# Patient Record
Sex: MALE | Race: WHITE | NOT HISPANIC OR LATINO | Employment: FULL TIME | URBAN - METROPOLITAN AREA
[De-identification: names, ages, dates, MRNs, and addresses within clinical notes are randomized per-mention and may not be internally consistent; named-entity substitution may affect disease eponyms.]

---

## 2017-03-15 ENCOUNTER — OFFICE VISIT (OUTPATIENT)
Dept: INTERNAL MEDICINE | Age: 47
End: 2017-03-15

## 2017-03-15 VITALS
SYSTOLIC BLOOD PRESSURE: 154 MMHG | HEART RATE: 76 BPM | TEMPERATURE: 98.3 F | WEIGHT: 268 LBS | BODY MASS INDEX: 38.37 KG/M2 | HEIGHT: 70 IN | OXYGEN SATURATION: 99 % | DIASTOLIC BLOOD PRESSURE: 78 MMHG

## 2017-03-15 DIAGNOSIS — J11.1 FLU: Primary | ICD-10-CM

## 2017-03-15 DIAGNOSIS — R52 BODY ACHES: Primary | ICD-10-CM

## 2017-03-15 LAB
EXPIRATION DATE: ABNORMAL
FLUAV AG NPH QL: POSITIVE
FLUBV AG NPH QL: NEGATIVE
INTERNAL CONTROL: ABNORMAL
Lab: ABNORMAL

## 2017-03-15 PROCEDURE — 99214 OFFICE O/P EST MOD 30 MIN: CPT | Performed by: NURSE PRACTITIONER

## 2017-03-15 PROCEDURE — 87804 INFLUENZA ASSAY W/OPTIC: CPT | Performed by: NURSE PRACTITIONER

## 2017-03-15 RX ORDER — OSELTAMIVIR PHOSPHATE 75 MG/1
75 CAPSULE ORAL 2 TIMES DAILY
Qty: 10 CAPSULE | Refills: 0 | Status: SHIPPED | OUTPATIENT
Start: 2017-03-15 | End: 2019-02-19

## 2017-03-15 NOTE — PROGRESS NOTES
Moose Siddiqui / 46 y.o. / male  03/15/2017      CC:  Main reason(s) for today's visit: Cough (dry to some mucus x 3 days) and Generalized Body Aches (states hes feverish but no temp recorded)      HPI:   Patient presents to the office with C/O feelings of fever, chills, body aches, and cough. States that he has parents who have been ill and he may have been exposed to the flu. States that he has had sinus headaches and congestion as well.     The following portions of the patient's history were reviewed and updated as appropriate: past medical history and past surgical history.    ASSESSMENT & PLAN:    Problem List Items Addressed This Visit     None      Visit Diagnoses     Flu    -  Primary    Relevant Medications    oseltamivir (TAMIFLU) 75 MG capsule        No orders of the defined types were placed in this encounter.      · Summary/Discussion:     1. Patient presented to the office with CO fever, chills, body aches and cough. Upon physical inspection, patient lungs were CTA, but has some frontal sinus pressure, runny nose and productive cough. Flu test performed and was positive for the flu.  Patient was advised that we will prescribe  tamiflu to be taken as directed,and to increase his oral fluids. Also advised to monitor for worsening of symptoms and to contact the office if symptoms do not improve.      Follow-up: Return if symptoms worsen or fail to improve.     No future appointments.  ____________________________________________________________________    REVIEW OF SYSTEMS    Review of Systems   Constitutional: Positive for chills, fatigue and fever. Negative for activity change, appetite change and diaphoresis.   HENT: Positive for postnasal drip and sore throat. Negative for congestion, dental problem, ear discharge, facial swelling, hearing loss, mouth sores, trouble swallowing and voice change.    Eyes: Negative.    Respiratory: Positive for cough and wheezing. Negative for apnea, choking,  "chest tightness, shortness of breath and stridor.    Cardiovascular: Negative for chest pain, palpitations and leg swelling.   Gastrointestinal: Negative for abdominal distention, abdominal pain, anal bleeding, blood in stool, constipation, diarrhea, nausea, rectal pain and vomiting.   Endocrine: Negative for cold intolerance and heat intolerance.   Genitourinary: Negative for decreased urine volume, difficulty urinating, dysuria, enuresis, flank pain, frequency, genital sores, hematuria and urgency.   Musculoskeletal: Positive for myalgias. Negative for arthralgias, back pain, gait problem, joint swelling, neck pain and neck stiffness.   Skin: Negative for color change, pallor, rash and wound.   Allergic/Immunologic: Negative for environmental allergies, food allergies and immunocompromised state.   Neurological: Positive for headaches. Negative for dizziness, tremors, seizures, syncope, facial asymmetry, speech difficulty, weakness, light-headedness and numbness.   Hematological: Negative for adenopathy. Does not bruise/bleed easily.   Psychiatric/Behavioral: Negative for agitation, confusion, decreased concentration, self-injury, sleep disturbance and suicidal ideas. The patient is not nervous/anxious and is not hyperactive.    All other systems reviewed and are negative.    Other: As noted per HPI      VITALS    Visit Vitals   • /78   • Pulse 76   • Temp 98.3 °F (36.8 °C)   • Ht 70\" (177.8 cm)   • Wt 268 lb (122 kg)   • SpO2 99%   • BMI 38.45 kg/m2     BP Readings from Last 3 Encounters:   03/15/17 154/78   03/09/15 140/80   12/09/14 142/84     Wt Readings from Last 3 Encounters:   03/15/17 268 lb (122 kg)   03/09/15 252 lb 0.1 oz (114 kg)   12/09/14 258 lb 0.1 oz (117 kg)      Body mass index is 38.45 kg/(m^2).    PHYSICAL EXAMINATION    Physical Exam   Constitutional: He is oriented to person, place, and time. He appears well-developed and well-nourished.   HENT:   Head: Normocephalic.   Nose: Right " sinus exhibits frontal sinus tenderness. Left sinus exhibits frontal sinus tenderness.   Mouth/Throat: Uvula is midline and mucous membranes are normal. Posterior oropharyngeal erythema present.   Eyes: Conjunctivae are normal. Pupils are equal, round, and reactive to light.   Neck: Normal range of motion.   Cardiovascular: Normal rate and regular rhythm.    Pulmonary/Chest: Effort normal and breath sounds normal. No respiratory distress. He has no wheezes. He has no rales. He exhibits no tenderness.   Musculoskeletal: Normal range of motion.   Neurological: He is alert and oriented to person, place, and time.   Skin: Skin is warm and dry.   Psychiatric: He has a normal mood and affect. His behavior is normal.   Vitals reviewed.      REVIEWED DATA:    Labs:   Lab Results   Component Value Date     03/03/2015    K 4.4 03/03/2015    AST 15 03/03/2015    ALT 21 03/03/2015    BUN 17 03/03/2015    CREATININE 1.04 03/03/2015    EGFRIFNONA >60 03/03/2015    EGFRIFAFRI >60 03/03/2015       Lab Results   Component Value Date    GLU 95 03/03/2015    HGBA1C 5.4 03/03/2015       Lab Results   Component Value Date     (H) 03/03/2015    HDL 70 (H) 03/03/2015    TRIG 124 03/03/2015    CHOLHDLRATIO 3.5 03/03/2015       Lab Results   Component Value Date    TSH 1.17 03/03/2015    FREET4 1.20 03/03/2015          Lab Results   Component Value Date    WBC 0-2 03/09/2015        Imaging:        Medical Tests:        Summary of old records / correspondence / consultant report:        Request outside records:          ALLERGIES:    Review of patient's allergies indicates no known allergies.    MEDICATIONS:  Current Outpatient Prescriptions   Medication Sig Dispense Refill   • oseltamivir (TAMIFLU) 75 MG capsule Take 1 capsule by mouth 2 (Two) Times a Day. 10 capsule 0     No current facility-administered medications for this visit.        [facility]    The following portions of the patient's history were reviewed and updated as  appropriate: Allergies / Current Medications / Past Medical History / Surgical History / Social History / Family History    PROBLEM LIST:    There is no problem list on file for this patient.      PAST MEDICAL HX:    Past Medical History   Diagnosis Date   • Hypertension        PAST SURGICAL HX:    Past Surgical History   Procedure Laterality Date   • Appendectomy     • Colonoscopy         SOCIAL HX:    Social History     Social History   • Marital status: Unknown     Spouse name: N/A   • Number of children: N/A   • Years of education: N/A     Social History Main Topics   • Smoking status: Never Smoker   • Smokeless tobacco: Never Used   • Alcohol use None      Comment: occassional   • Drug use: None   • Sexual activity: Not Asked     Other Topics Concern   • None     Social History Narrative   • None       FAMILY HX:    Family History   Problem Relation Age of Onset   • Hypertension Mother    • Gallbladder disease Mother    • Cancer Mother    • Colon cancer Father    • Hypertension Father    • Gallbladder disease Father    • Cancer Father

## 2019-02-13 ENCOUNTER — TELEPHONE (OUTPATIENT)
Dept: INTERNAL MEDICINE | Age: 49
End: 2019-02-13

## 2019-02-19 ENCOUNTER — OFFICE VISIT (OUTPATIENT)
Dept: INTERNAL MEDICINE | Age: 49
End: 2019-02-19

## 2019-02-19 VITALS
OXYGEN SATURATION: 98 % | TEMPERATURE: 98.6 F | BODY MASS INDEX: 35.71 KG/M2 | HEART RATE: 80 BPM | HEIGHT: 70 IN | DIASTOLIC BLOOD PRESSURE: 88 MMHG | SYSTOLIC BLOOD PRESSURE: 140 MMHG | WEIGHT: 249.4 LBS

## 2019-02-19 DIAGNOSIS — I10 ESSENTIAL HYPERTENSION: Primary | ICD-10-CM

## 2019-02-19 DIAGNOSIS — E66.9 OBESITY (BMI 30-39.9): ICD-10-CM

## 2019-02-19 DIAGNOSIS — E78.5 HYPERLIPIDEMIA, UNSPECIFIED HYPERLIPIDEMIA TYPE: ICD-10-CM

## 2019-02-19 PROCEDURE — 99204 OFFICE O/P NEW MOD 45 MIN: CPT | Performed by: INTERNAL MEDICINE

## 2019-02-19 RX ORDER — HYDROCHLOROTHIAZIDE 12.5 MG/1
12.5 TABLET ORAL DAILY
Qty: 30 TABLET | Refills: 3 | Status: SHIPPED | OUTPATIENT
Start: 2019-02-19 | End: 2019-05-29 | Stop reason: SDUPTHER

## 2019-02-19 RX ORDER — IBUPROFEN 200 MG
200 TABLET ORAL EVERY 6 HOURS PRN
COMMUNITY
End: 2019-05-29

## 2019-02-19 NOTE — PROGRESS NOTES
"[facility] [place] [facility]  BITA ANN M.D.      Moose TAHIR Siddiqui / 48 y.o. / male  02/19/2019      ASSESSMENT & PLAN:    Problem List Items Addressed This Visit        High    Essential hypertension - Primary (Chronic)    Current Assessment & Plan     This is a new problem to this examiner.   Start HCTZ 12.5 mg qd. Maintain low sodium diet of less than 3 gm.  Weight loss and regular exercise advised.          Relevant Medications    hydrochlorothiazide (HYDRODIURIL) 12.5 MG tablet    Other Relevant Orders    Comprehensive Metabolic Panel    TSH+Free T4    Urinalysis With Microscopic If Indicated (No Culture) - Urine, Clean Catch       Medium    Hyperlipidemia (Chronic)    Current Assessment & Plan     Check labs. Consider statin therapy. Maintain low fat/cholesterol diet.  Weight loss advised.          Relevant Orders    Lipid Panel With / Chol / HDL Ratio       Low    Obesity (BMI 30-39.9) (Chronic)    Current Assessment & Plan     Maintain a low sugar/starch/carbohydrate diet and exercise regularly. Wt loss advised.           Relevant Orders    Hemoglobin A1c        Orders Placed This Encounter   Procedures   • Hemoglobin A1c   • Lipid Panel With / Chol / HDL Ratio   • Comprehensive Metabolic Panel   • TSH+Free T4   • Urinalysis With Microscopic If Indicated (No Culture) - Urine, Clean Catch     New Medications Ordered This Visit   Medications   • hydrochlorothiazide (HYDRODIURIL) 12.5 MG tablet     Sig: Take 1 tablet by mouth Daily.     Dispense:  30 tablet     Refill:  3       Summary/Discussion:      Return in about 3 months (around 5/19/2019) for Reassess today's problem(s).    ____________________________________________________________________    VITALS:    Visit Vitals  /88   Pulse 80   Temp 98.6 °F (37 °C)   Ht 177.8 cm (70\")   Wt 113 kg (249 lb 6.4 oz)   SpO2 98%   BMI 35.79 kg/m²       BP Readings from Last 3 Encounters:   02/19/19 140/88   03/15/17 154/78   03/09/15 140/80     Wt Readings from " Last 3 Encounters:   02/19/19 113 kg (249 lb 6.4 oz)   03/15/17 122 kg (268 lb)   03/09/15 114 kg (252 lb 0.1 oz)      Body mass index is 35.79 kg/m².    CC: Main reason(s) for today's visit: Establish Care (Re-establish care )      HPI:    Patient is a 48 y.o. male who is here to re-establish care.  It has been greater than 3 years since his last visit with me.  Previously his blood pressure has been borderline high but has never been treated for hypertension.  There is strong family history of hypertension.  He denies chest pains or unusual headaches.  He has history of obesity with no significant change in his weight over the last 3 years.  He does snore but denies history of sleep apnea as far as he knows.  History of hyperlipidemia with previous LDL level greater than 150.  No prior medication for cholesterol.      Patient Care Team:  Luis Antonio Berumen MD as PCP - General (Internal Medicine)  ____________________________________________________________________      REVIEW OF SYSTEMS    Review of Systems   Respiratory:        Snores    Cardiovascular: Negative for chest pain, palpitations and leg swelling.   All other systems reviewed and are negative.      PHYSICAL EXAMINATION    Physical Exam   Constitutional: He appears well-nourished. No distress.   Obese   HENT:   Head: Normocephalic.   Right Ear: Tympanic membrane normal.   Left Ear: Tympanic membrane normal.   Mouth/Throat: Oropharynx is clear and moist. No oral lesions.   Large uvula  Mellampati Airway Class 2    Eyes: Conjunctivae are normal. No scleral icterus.   Neck: Neck supple. No tracheal deviation present. No thyromegaly present.   Cardiovascular: Normal rate, regular rhythm, normal heart sounds and intact distal pulses. Exam reveals no gallop and no friction rub.   No murmur heard.  Pulmonary/Chest: Effort normal and breath sounds normal.   Abdominal: Soft. Bowel sounds are normal. He exhibits no distension and no mass. There is no tenderness. No  hernia.   Musculoskeletal: He exhibits no edema or deformity.   Lymphadenopathy:     He has no cervical adenopathy.        Right: No supraclavicular adenopathy present.        Left: No supraclavicular adenopathy present.   Neurological: He is alert. He has normal reflexes.   Skin: Skin is intact. No rash noted. No erythema. No pallor.   No jaundice   Psychiatric: He has a normal mood and affect. His behavior is normal. Judgment and thought content normal. Cognition and memory are normal.         REVIEWED DATA:    Labs:   Lab Results   Component Value Date     03/03/2015    K 4.4 03/03/2015    AST 15 03/03/2015    ALT 21 03/03/2015    BUN 17 03/03/2015    CREATININE 1.04 03/03/2015    EGFRIFNONA >60 03/03/2015    EGFRIFAFRI >60 03/03/2015       Lab Results   Component Value Date    HGBA1C 5.4 03/03/2015       Lab Results   Component Value Date     (H) 03/03/2015    HDL 70 (H) 03/03/2015    TRIG 124 03/03/2015    CHOLHDLRATIO 3.5 03/03/2015       Lab Results   Component Value Date    TSH 1.17 03/03/2015    FREET4 1.20 03/03/2015          Lab Results   Component Value Date    WBC 0-2 03/09/2015         Imaging:        Medical Tests:        Summary of old records / correspondence / consultant report:        Request outside records:        ______________________________________________________________________    ALLERGIES  No Known Allergies     MEDICATIONS  Current Outpatient Medications on File Prior to Visit   Medication Sig   • ibuprofen (ADVIL,MOTRIN) 200 MG tablet Take 200 mg by mouth Every 6 (Six) Hours As Needed for Mild Pain .     PFSH:     The following portions of the patient's history were reviewed and updated as appropriate: Allergies / Current Medications / Past Medical History / Surgical History / Social History / Family History    PROBLEM LIST   Patient Active Problem List   Diagnosis   • Essential hypertension   • Obesity (BMI 30-39.9)   • Hyperlipidemia       PAST MEDICAL HISTORY  Past  Medical History:   Diagnosis Date   • Hypertension        SURGICAL HISTORY  Past Surgical History:   Procedure Laterality Date   • APPENDECTOMY     • COLONOSCOPY         SOCIAL HISTORY  Social History     Socioeconomic History   • Marital status:      Spouse name: Kallie   • Number of children: 2   • Years of education: Not on file   • Highest education level: Not on file   Occupational History   • Occupation: Medical office   Tobacco Use   • Smoking status: Former Smoker   • Smokeless tobacco: Never Used   • Tobacco comment: smoked in HS   Substance and Sexual Activity   • Alcohol use: Yes     Frequency: 2-4 times a month     Drinks per session: 1 or 2   • Sexual activity: Yes       FAMILY HISTORY  Family History   Problem Relation Age of Onset   • Hypertension Mother    • Other Mother         benign brain tumor   • Colon cancer Father    • Hypertension Father    • Atrial fibrillation Father    • No Known Problems Sister    • Hypertension Brother    • Obesity Brother    • Hypertension Brother    • Depression Son         suicidality         **Dragon Disclaimer:   Much of this encounter note is an electronic transcription/translation of spoken language to printed text. The electronic translation of spoken language may permit erroneous, or at times, nonsensical words or phrases to be inadvertently transcribed. Although I have reviewed the note for such errors, some may still exist.       Template created by Gilberto Berumen MD

## 2019-02-19 NOTE — ASSESSMENT & PLAN NOTE
This is a new problem to this examiner.   Start HCTZ 12.5 mg qd. Maintain low sodium diet of less than 3 gm.  Weight loss and regular exercise advised.

## 2019-02-20 LAB
ALBUMIN SERPL-MCNC: 5 G/DL (ref 3.5–5.5)
ALBUMIN/GLOB SERPL: 2.2 {RATIO} (ref 1.2–2.2)
ALP SERPL-CCNC: 85 IU/L (ref 39–117)
ALT SERPL-CCNC: 17 IU/L (ref 0–44)
APPEARANCE UR: CLEAR
AST SERPL-CCNC: 15 IU/L (ref 0–40)
BILIRUB SERPL-MCNC: 0.5 MG/DL (ref 0–1.2)
BILIRUB UR QL STRIP: NEGATIVE
BUN SERPL-MCNC: 14 MG/DL (ref 6–24)
BUN/CREAT SERPL: 15 (ref 9–20)
CALCIUM SERPL-MCNC: 9.9 MG/DL (ref 8.7–10.2)
CHLORIDE SERPL-SCNC: 103 MMOL/L (ref 96–106)
CHOLEST SERPL-MCNC: 209 MG/DL (ref 100–199)
CHOLEST/HDLC SERPL: 3.6 RATIO (ref 0–5)
CO2 SERPL-SCNC: 25 MMOL/L (ref 20–29)
COLOR UR: YELLOW
CREAT SERPL-MCNC: 0.94 MG/DL (ref 0.76–1.27)
GLOBULIN SER CALC-MCNC: 2.3 G/DL (ref 1.5–4.5)
GLUCOSE SERPL-MCNC: 88 MG/DL (ref 65–99)
GLUCOSE UR QL: NEGATIVE
HBA1C MFR BLD: 5.2 % (ref 4.8–5.6)
HDLC SERPL-MCNC: 58 MG/DL
HGB UR QL STRIP: NEGATIVE
KETONES UR QL STRIP: NEGATIVE
LDLC SERPL CALC-MCNC: 123 MG/DL (ref 0–99)
LEUKOCYTE ESTERASE UR QL STRIP: NEGATIVE
MICRO URNS: NORMAL
NITRITE UR QL STRIP: NEGATIVE
PH UR STRIP: 6.5 [PH] (ref 5–7.5)
POTASSIUM SERPL-SCNC: 5 MMOL/L (ref 3.5–5.2)
PROT SERPL-MCNC: 7.3 G/DL (ref 6–8.5)
PROT UR QL STRIP: NEGATIVE
SODIUM SERPL-SCNC: 143 MMOL/L (ref 134–144)
SP GR UR: 1.02 (ref 1–1.03)
T4 FREE SERPL-MCNC: 1.27 NG/DL (ref 0.82–1.77)
TRIGL SERPL-MCNC: 141 MG/DL (ref 0–149)
TSH SERPL DL<=0.005 MIU/L-ACNC: 1.09 UIU/ML (ref 0.45–4.5)
UROBILINOGEN UR STRIP-MCNC: 0.2 MG/DL (ref 0.2–1)
VLDLC SERPL CALC-MCNC: 28 MG/DL (ref 5–40)

## 2019-05-29 ENCOUNTER — OFFICE VISIT (OUTPATIENT)
Dept: INTERNAL MEDICINE | Age: 49
End: 2019-05-29

## 2019-05-29 VITALS
TEMPERATURE: 97.7 F | OXYGEN SATURATION: 94 % | DIASTOLIC BLOOD PRESSURE: 84 MMHG | HEIGHT: 70 IN | RESPIRATION RATE: 18 BRPM | SYSTOLIC BLOOD PRESSURE: 118 MMHG | HEART RATE: 103 BPM | BODY MASS INDEX: 36.59 KG/M2 | WEIGHT: 255.6 LBS

## 2019-05-29 DIAGNOSIS — I10 ESSENTIAL HYPERTENSION: Primary | Chronic | ICD-10-CM

## 2019-05-29 DIAGNOSIS — E78.5 HYPERLIPIDEMIA, UNSPECIFIED HYPERLIPIDEMIA TYPE: Chronic | ICD-10-CM

## 2019-05-29 DIAGNOSIS — E66.9 OBESITY (BMI 30-39.9): Chronic | ICD-10-CM

## 2019-05-29 LAB
BUN SERPL-MCNC: 15 MG/DL (ref 6–20)
BUN/CREAT SERPL: 17.2 (ref 7–25)
CALCIUM SERPL-MCNC: 10.1 MG/DL (ref 8.6–10.5)
CHLORIDE SERPL-SCNC: 99 MMOL/L (ref 98–107)
CO2 SERPL-SCNC: 28.3 MMOL/L (ref 22–29)
CREAT SERPL-MCNC: 0.87 MG/DL (ref 0.76–1.27)
GLUCOSE SERPL-MCNC: 93 MG/DL (ref 65–99)
POTASSIUM SERPL-SCNC: 4.2 MMOL/L (ref 3.5–5.2)
SODIUM SERPL-SCNC: 140 MMOL/L (ref 136–145)

## 2019-05-29 PROCEDURE — 99214 OFFICE O/P EST MOD 30 MIN: CPT | Performed by: INTERNAL MEDICINE

## 2019-05-29 RX ORDER — HYDROCHLOROTHIAZIDE 12.5 MG/1
12.5 TABLET ORAL DAILY
Qty: 90 TABLET | Refills: 2 | Status: SHIPPED | OUTPATIENT
Start: 2019-05-29 | End: 2020-04-12

## 2019-05-29 RX ORDER — ACETAMINOPHEN 325 MG/1
650 TABLET ORAL EVERY 6 HOURS PRN
COMMUNITY
End: 2020-02-12

## 2019-05-29 NOTE — ASSESSMENT & PLAN NOTE
· The following were discussed: low calorie, low carb based diet program, portion control, make dinner lightest meal of day, avoid eating sweets, desserts, and excess amount of fruits, increase physical activity, monitor steps taken daily and join a fitness center or start home based exercise program   · Goal weight loss of 15 lbs / 3 months; 30 lbs/6 months.   · Follow-up 6 months.

## 2019-05-29 NOTE — PROGRESS NOTES
[facility] [place] [facility]  BITA ANN M.D.      Moose Siddiqui / 48 y.o. / male  05/29/2019    ASSESSMENT & PLAN     Problem List Items Addressed This Visit        High    Essential hypertension - Primary (Chronic)    Current Assessment & Plan     Improved. Continue HCTZ 12.5 mg qd. Check BMP.          Relevant Medications    hydrochlorothiazide (HYDRODIURIL) 12.5 MG tablet    Other Relevant Orders    Basic Metabolic Panel       Medium    Hyperlipidemia (Chronic)    Overview     Maintain low fat/cholesterol diet.           Current Assessment & Plan     Lab Results   Component Value Date     (H) 02/19/2019     (H) 03/03/2015    HDL 58 02/19/2019    HDL 70 (H) 03/03/2015    TRIG 141 02/19/2019    CHOLHDLRATIO 3.6 02/19/2019    CHOLHDLRATIO 3.5 03/03/2015                Low    Obesity (BMI 30-39.9) (Chronic)    Current Assessment & Plan     · The following were discussed: low calorie, low carb based diet program, portion control, make dinner lightest meal of day, avoid eating sweets, desserts, and excess amount of fruits, increase physical activity, monitor steps taken daily and join a fitness center or start home based exercise program   · Goal weight loss of 15 lbs / 3 months; 30 lbs/6 months.   · Follow-up 6 months.                 Orders Placed This Encounter   Procedures   • Basic Metabolic Panel     New Medications Ordered This Visit   Medications   • hydrochlorothiazide (HYDRODIURIL) 12.5 MG tablet     Sig: Take 1 tablet by mouth Daily.     Dispense:  90 tablet     Refill:  2       Summary/Discussion:  ·     Next Appointment with me: Visit date not found    Return in about 6 months (around 11/29/2019) for Hypertension.      MEDICATIONS  Current Outpatient Medications   Medication Sig Dispense Refill   • acetaminophen (TYLENOL) 325 MG tablet Take 650 mg by mouth Every 6 (Six) Hours As Needed for Mild Pain .     • hydrochlorothiazide (HYDRODIURIL) 12.5 MG tablet Take 1 tablet by mouth Daily. 90  "tablet 2     No current facility-administered medications for this visit.           VITALS:    Visit Vitals  /84 (BP Location: Left arm, Patient Position: Sitting, Cuff Size: Large Adult)   Pulse 103   Temp 97.7 °F (36.5 °C) (Oral)   Resp 18   Ht 177.8 cm (70\")   Wt 116 kg (255 lb 9.6 oz)   SpO2 94%   BMI 36.67 kg/m²       BP Readings from Last 3 Encounters:   05/29/19 118/84   02/19/19 140/88   03/15/17 154/78     Wt Readings from Last 3 Encounters:   05/29/19 116 kg (255 lb 9.6 oz)   02/19/19 113 kg (249 lb 6.4 oz)   03/15/17 122 kg (268 lb)      Body mass index is 36.67 kg/m².        CC:  Main reason(s) for today's visit: Hypertension      HPI:     Chronic essential hypertension:  Since prior visit: compliant with medication(s), checks blood pressure occasionally and denies significant problems with medication(s).  Most recent in-office blood pressure readings:   BP Readings from Last 3 Encounters:   05/29/19 118/84   02/19/19 140/88   03/15/17 154/78     Chronic hyperlipidemia:  Current therapy include no prior medication, no medication (previously deferred).    Most recent labs:   Lab Results   Component Value Date     (H) 02/19/2019     (H) 03/03/2015    HDL 58 02/19/2019    HDL 70 (H) 03/03/2015    TRIG 141 02/19/2019    CHOLHDLRATIO 3.6 02/19/2019    CHOLHDLRATIO 3.5 03/03/2015     Obesity: Weight has been increasing since last visit, has not been watching diet, has been sedentary.  Weight loss efforts: no specific plan(s).  Current Body mass index is 36.67 kg/m².   Wt Readings from Last 3 Encounters:   05/29/19 116 kg (255 lb 9.6 oz)   02/19/19 113 kg (249 lb 6.4 oz)   03/15/17 122 kg (268 lb)         Patient Care Team:  Luis Antonio Berumen MD as PCP - General (Internal Medicine)      REVIEW OF SYSTEMS     Review of Systems  Constitutional neg x weight gain  Resp neg  CV neg  Neuro neg       PHYSICAL EXAMINATION     Physical Exam  Constitutional  No distress, obese   Cardiovascular Rate  normal " . Rhythm: regular . Heart sounds:  Normal  Psychiatric  Alert. Judgment and thought content normal. Mood normal      REVIEWED DATA     Labs:     Lab Results   Component Value Date     02/19/2019    K 5.0 02/19/2019    CALCIUM 9.9 02/19/2019    AST 15 02/19/2019    ALT 17 02/19/2019    BUN 14 02/19/2019    CREATININE 0.94 02/19/2019    CREATININE 1.04 03/03/2015    EGFRIFNONA 94 02/19/2019    EGFRIFAFRI 109 02/19/2019       Lab Results   Component Value Date    HGBA1C 5.2 02/19/2019    HGBA1C 5.4 03/03/2015       Lab Results   Component Value Date     (H) 02/19/2019     (H) 03/03/2015    HDL 58 02/19/2019    HDL 70 (H) 03/03/2015    TRIG 141 02/19/2019    TRIG 124 03/03/2015    CHOLHDLRATIO 3.6 02/19/2019    CHOLHDLRATIO 3.5 03/03/2015       Lab Results   Component Value Date    TSH 1.090 02/19/2019    FREET4 1.27 02/19/2019       Lab Results   Component Value Date    WBC 0-2 03/09/2015       Lab Results   Component Value Date    PROTEIN Negative 02/19/2019    GLUCOSEU Negative 02/19/2019    BLOODU Negative 02/19/2019    NITRITEU Negative 02/19/2019    LEUKOCYTESUR Negative 02/19/2019       Imaging:         Medical Tests:         Summary of old records / correspondence / consultant report:         Request outside records:         ALLERGIES  No Known Allergies     PFSH:     The following portions of the patient's history were reviewed and updated as appropriate: Allergies / Current Medications / Past Medical History / Surgical History / Social History / Family History    PROBLEM LIST   Patient Active Problem List   Diagnosis   • Essential hypertension   • Obesity (BMI 30-39.9)   • Hyperlipidemia       PAST MEDICAL HISTORY  Past Medical History:   Diagnosis Date   • Hypertension        SURGICAL HISTORY  Past Surgical History:   Procedure Laterality Date   • APPENDECTOMY     • COLONOSCOPY         SOCIAL HISTORY  Social History     Socioeconomic History   • Marital status:      Spouse name:  Kallie   • Number of children: 2   • Years of education: Not on file   • Highest education level: Not on file   Occupational History   • Occupation: Medical office   Tobacco Use   • Smoking status: Never Smoker   • Smokeless tobacco: Never Used   Substance and Sexual Activity   • Alcohol use: Yes     Frequency: 2-4 times a month     Drinks per session: 1 or 2   • Sexual activity: Yes   Lifestyle   • Physical activity:     Days per week: 0 days     Minutes per session: Not on file   • Stress: To some extent       FAMILY HISTORY  Family History   Problem Relation Age of Onset   • Hypertension Mother    • Other Mother         benign brain tumor   • Colon cancer Father    • Hypertension Father    • Atrial fibrillation Father    • No Known Problems Sister    • Hypertension Brother    • Obesity Brother    • Hypertension Brother    • Depression Son         suicidality         **Dragon Disclaimer:   Much of this encounter note is an electronic transcription/translation of spoken language to printed text. The electronic translation of spoken language may permit erroneous, or at times, nonsensical words or phrases to be inadvertently transcribed. Although I have reviewed the note for such errors, some may still exist.       Template created by Gilberto Berumen MD

## 2019-05-29 NOTE — ASSESSMENT & PLAN NOTE
Lab Results   Component Value Date     (H) 02/19/2019     (H) 03/03/2015    HDL 58 02/19/2019    HDL 70 (H) 03/03/2015    TRIG 141 02/19/2019    CHOLHDLRATIO 3.6 02/19/2019    CHOLHDLRATIO 3.5 03/03/2015

## 2020-02-12 ENCOUNTER — OFFICE VISIT (OUTPATIENT)
Dept: INTERNAL MEDICINE | Age: 50
End: 2020-02-12

## 2020-02-12 VITALS
HEIGHT: 70 IN | HEART RATE: 82 BPM | OXYGEN SATURATION: 96 % | SYSTOLIC BLOOD PRESSURE: 146 MMHG | WEIGHT: 262 LBS | TEMPERATURE: 98.2 F | DIASTOLIC BLOOD PRESSURE: 94 MMHG | BODY MASS INDEX: 37.51 KG/M2

## 2020-02-12 DIAGNOSIS — E78.5 HYPERLIPIDEMIA, UNSPECIFIED HYPERLIPIDEMIA TYPE: Chronic | ICD-10-CM

## 2020-02-12 DIAGNOSIS — E66.9 OBESITY (BMI 30-39.9): Chronic | ICD-10-CM

## 2020-02-12 DIAGNOSIS — I10 ESSENTIAL HYPERTENSION: Primary | Chronic | ICD-10-CM

## 2020-02-12 DIAGNOSIS — Z80.0 FAMILY HISTORY OF COLON CANCER: ICD-10-CM

## 2020-02-12 LAB
ALBUMIN SERPL-MCNC: 4.8 G/DL (ref 3.5–5.2)
ALBUMIN/GLOB SERPL: 2.1 G/DL
ALP SERPL-CCNC: 71 U/L (ref 39–117)
ALT SERPL-CCNC: 37 U/L (ref 1–41)
AST SERPL-CCNC: 19 U/L (ref 1–40)
BILIRUB SERPL-MCNC: 0.5 MG/DL (ref 0.2–1.2)
BUN SERPL-MCNC: 17 MG/DL (ref 6–20)
BUN/CREAT SERPL: 16.2 (ref 7–25)
CALCIUM SERPL-MCNC: 9.6 MG/DL (ref 8.6–10.5)
CHLORIDE SERPL-SCNC: 99 MMOL/L (ref 98–107)
CHOLEST SERPL-MCNC: 236 MG/DL (ref 0–200)
CHOLEST/HDLC SERPL: 4.37 {RATIO}
CO2 SERPL-SCNC: 27.9 MMOL/L (ref 22–29)
CREAT SERPL-MCNC: 1.05 MG/DL (ref 0.76–1.27)
GLOBULIN SER CALC-MCNC: 2.3 GM/DL
GLUCOSE SERPL-MCNC: 105 MG/DL (ref 65–99)
HBA1C MFR BLD: 5.6 % (ref 4.8–5.6)
HDLC SERPL-MCNC: 54 MG/DL (ref 40–60)
LDLC SERPL CALC-MCNC: 143 MG/DL (ref 0–100)
POTASSIUM SERPL-SCNC: 4.4 MMOL/L (ref 3.5–5.2)
PROT SERPL-MCNC: 7.1 G/DL (ref 6–8.5)
SODIUM SERPL-SCNC: 141 MMOL/L (ref 136–145)
TRIGL SERPL-MCNC: 194 MG/DL (ref 0–150)
VLDLC SERPL CALC-MCNC: 38.8 MG/DL

## 2020-02-12 PROCEDURE — 99214 OFFICE O/P EST MOD 30 MIN: CPT | Performed by: INTERNAL MEDICINE

## 2020-02-12 RX ORDER — LISINOPRIL 10 MG/1
10 TABLET ORAL DAILY
Qty: 30 TABLET | Refills: 3 | Status: SHIPPED | OUTPATIENT
Start: 2020-02-12 | End: 2020-06-08

## 2020-02-12 NOTE — PROGRESS NOTES
[facility] [place] [facility]  BITA ANN M.D.      Moose Ramirezner / 49 y.o. / male  02/12/2020      CHIEF COMPLAINT     Hypertension      ASSESSMENT & PLAN     Problem List Items Addressed This Visit        High    Essential hypertension - Primary (Chronic)    Current Assessment & Plan     Uncontrolled.   Start lisinopril 10 mg qd.   Continue HCTZ 12.5 mg qd.   Send blood pressure readings in 1 month.     BP Readings from Last 3 Encounters:   02/12/20 146/94   05/29/19 118/84   02/19/19 140/88             Relevant Medications    hydrochlorothiazide (HYDRODIURIL) 12.5 MG tablet    lisinopril (PRINIVIL,ZESTRIL) 10 MG tablet    Other Relevant Orders    Comprehensive Metabolic Panel       Medium    Hyperlipidemia (Chronic)    Current Assessment & Plan     Check labs.   Maintain low fat/cholesterol diet.  Weight loss advised.          Relevant Orders    Lipid Panel With / Chol / HDL Ratio       Low    Obesity (BMI 30-39.9) (Chronic)    Current Assessment & Plan     Noted weight gain due to poor lifestyle habits.   · The following were discussed: low calorie, low carb based diet program, portion control, make dinner lightest meal of day, avoid eating sweets, desserts, and excess amount of fruits and increase physical activity, monitor steps taken daily.  · Avoid eating 1 huge meal late every night.   · Goal 15-20 lbs weight loss next 3 months.     BMI Readings from Last 3 Encounters:   02/12/20 37.59 kg/m²   05/29/19 36.67 kg/m²   02/19/19 35.79 kg/m²      Wt Readings from Last 3 Encounters:   02/12/20 119 kg (262 lb)   05/29/19 116 kg (255 lb 9.6 oz)   02/19/19 113 kg (249 lb 6.4 oz)                Relevant Orders    Hemoglobin A1c      Other Visit Diagnoses     Family history of colon cancer        Relevant Orders    Ambulatory Referral For Screening Colonoscopy        Orders Placed This Encounter   Procedures   • Hemoglobin A1c   • Lipid Panel With / Chol / HDL Ratio   • Comprehensive Metabolic Panel   • Ambulatory  "Referral For Screening Colonoscopy     New Medications Ordered This Visit   Medications   • lisinopril (PRINIVIL,ZESTRIL) 10 MG tablet     Sig: Take 1 tablet by mouth Daily.     Dispense:  30 tablet     Refill:  3       Summary/Discussion:  ·       Next Appointment with me: 6/1/2020    Return in about 3 months (around 5/12/2020) for Hypertension, Obesity.      MEDICATIONS     Current Outpatient Medications   Medication Sig Dispense Refill   • acetaminophen (TYLENOL) 325 MG tablet Take 650 mg by mouth Every 6 (Six) Hours As Needed for Mild Pain .     • hydrochlorothiazide (HYDRODIURIL) 12.5 MG tablet Take 1 tablet by mouth Daily. 90 tablet 2         VITAL SIGNS     Visit Vitals  /94 (BP Location: Left arm, Patient Position: Sitting, Cuff Size: Large Adult)   Pulse 82   Temp 98.2 °F (36.8 °C)   Ht 177.8 cm (70\")   Wt 119 kg (262 lb)   SpO2 96%   BMI 37.59 kg/m²       BP Readings from Last 3 Encounters:   02/12/20 146/94   05/29/19 118/84   02/19/19 140/88     Wt Readings from Last 3 Encounters:   02/12/20 119 kg (262 lb)   05/29/19 116 kg (255 lb 9.6 oz)   02/19/19 113 kg (249 lb 6.4 oz)      Body mass index is 37.59 kg/m².      HISTORY OF PRESENT ILLNESS     Chronic essential hypertension:  Since prior visit: compliant with medication(s), checks blood pressure occasionally, using a wrist cuff and denies significant problems with medication(s).  Most recent in-office blood pressure readings:   BP Readings from Last 3 Encounters:   02/12/20 146/94   05/29/19 118/84   02/19/19 140/88     Obesity: Weight has been increasing since last visit, has been eating poorly, has not made any significant dietary improvement, has not made significant changes to physical activity level.  Weight loss efforts: no specific plan(s), has not implemented increased physical activity.  Current Body mass index is 37.59 kg/m².   Wt Readings from Last 3 Encounters:   02/12/20 119 kg (262 lb)   05/29/19 116 kg (255 lb 9.6 oz)   02/19/19 " 113 kg (249 lb 6.4 oz)      Chronic hyperlipidemia:  Current therapy include no prior medication.    Most recent labs:   Lab Results   Component Value Date     (H) 02/19/2019     (H) 03/03/2015    HDL 58 02/19/2019    HDL 70 (H) 03/03/2015    TRIG 141 02/19/2019    CHOLHDLRATIO 3.6 02/19/2019    CHOLHDLRATIO 3.5 03/03/2015          Patient Care Team:  Luis Antonio Berumen MD as PCP - General (Internal Medicine)      REVIEW OF SYSTEMS     Review of Systems  Constitutional neg x weight gain   Resp neg x snoring  CV neg  Neur neg for headaches       PHYSICAL EXAMINATION     Physical Exam  Constitutional  No distress, obese/wt gain  Cardiovascular Rate  normal . Rhythm: regular . Heart sounds:  Normal. No lower extremity edema   Psychiatric  Alert. Judgment intact. Thought content normal. Mood normal    REVIEWED DATA     Labs:     Lab Results   Component Value Date     05/29/2019    K 4.2 05/29/2019    CALCIUM 10.1 05/29/2019    AST 15 02/19/2019    ALT 17 02/19/2019    BUN 15 05/29/2019    CREATININE 0.87 05/29/2019    CREATININE 0.94 02/19/2019    CREATININE 1.04 03/03/2015    EGFRIFNONA 93 05/29/2019    EGFRIFAFRI 113 05/29/2019       Lab Results   Component Value Date    HGBA1C 5.2 02/19/2019    HGBA1C 5.4 03/03/2015    GLU 93 05/29/2019    GLU 88 02/19/2019    GLU 95 03/03/2015       Lab Results   Component Value Date     (H) 02/19/2019     (H) 03/03/2015    HDL 58 02/19/2019    HDL 70 (H) 03/03/2015    TRIG 141 02/19/2019    TRIG 124 03/03/2015    CHOLHDLRATIO 3.6 02/19/2019    CHOLHDLRATIO 3.5 03/03/2015       Lab Results   Component Value Date    TSH 1.090 02/19/2019    FREET4 1.27 02/19/2019       Lab Results   Component Value Date    WBC 0-2 03/09/2015       Lab Results   Component Value Date    PROTEIN Negative 02/19/2019    GLUCOSEU Negative 02/19/2019    BLOODU Negative 02/19/2019    NITRITEU Negative 02/19/2019    LEUKOCYTESUR Negative 02/19/2019       Imaging:         Medical  Tests:         Summary of old records / correspondence / consultant report:         Request outside records:         ALLERGIES  No Known Allergies     PFSH:     The following portions of the patient's history were reviewed and updated as appropriate: Allergies / Current Medications / Past Medical History / Surgical History / Social History / Family History    PROBLEM LIST   Patient Active Problem List   Diagnosis   • Essential hypertension   • Obesity (BMI 30-39.9)   • Hyperlipidemia       PAST MEDICAL HISTORY  Past Medical History:   Diagnosis Date   • Hypertension        SURGICAL HISTORY  Past Surgical History:   Procedure Laterality Date   • APPENDECTOMY     • COLONOSCOPY         SOCIAL HISTORY  Social History     Socioeconomic History   • Marital status:      Spouse name: Kallie   • Number of children: 2   • Years of education: Not on file   • Highest education level: Not on file   Occupational History   • Occupation: Medical office   Tobacco Use   • Smoking status: Never Smoker   • Smokeless tobacco: Never Used   Substance and Sexual Activity   • Alcohol use: Yes     Frequency: 2-4 times a month     Drinks per session: 1 or 2   • Sexual activity: Yes   Lifestyle   • Physical activity:     Days per week: 0 days     Minutes per session: Not on file   • Stress: To some extent       FAMILY HISTORY  Family History   Problem Relation Age of Onset   • Hypertension Mother    • Other Mother         benign brain tumor   • Obesity Mother    • Colon cancer Father    • Hypertension Father    • Atrial fibrillation Father    • Obesity Father    • No Known Problems Sister    • Hypertension Brother    • Obesity Brother    • Hypertension Brother    • Depression Son         suicidality   • Diabetes Neg Hx          **Dragon Disclaimer:   Much of this encounter note is an electronic transcription/translation of spoken language to printed text. The electronic translation of spoken language may permit erroneous, or at times,  nonsensical words or phrases to be inadvertently transcribed. Although I have reviewed the note for such errors, some may still exist.       Template created by Gilberto Berumen MD

## 2020-02-12 NOTE — ASSESSMENT & PLAN NOTE
Noted weight gain due to poor lifestyle habits.   · The following were discussed: low calorie, low carb based diet program, portion control, make dinner lightest meal of day, avoid eating sweets, desserts, and excess amount of fruits and increase physical activity, monitor steps taken daily.  · Avoid eating 1 huge meal late every night.   · Goal 15-20 lbs weight loss next 3 months.     BMI Readings from Last 3 Encounters:   02/12/20 37.59 kg/m²   05/29/19 36.67 kg/m²   02/19/19 35.79 kg/m²      Wt Readings from Last 3 Encounters:   02/12/20 119 kg (262 lb)   05/29/19 116 kg (255 lb 9.6 oz)   02/19/19 113 kg (249 lb 6.4 oz)

## 2020-02-12 NOTE — PATIENT INSTRUCTIONS
** IMPORTANT MESSAGE FROM DR. ANN **    In our office, your satisfaction is VERY important to us.     You may receive a survey from [facility] by mail or E-mail for you to provide feedback about your visit. This information is invaluable for me to know what we can do to improve our services.     I ask that you please take a few minutes to complete the survey and let us know how we are doing in serving your needs. (You may receive the survey more than once for multiple visits)    Thank You !    Dr. Ann & Staff    _________________________________________________________________________________________________________________________      ** ADDITIONAL INSTRUCTION / REMINDERS FROM DR. ANN **    · Don't forget Adacel (Tdap) vaccination along with 2nd Shingrix shot.

## 2020-02-12 NOTE — ASSESSMENT & PLAN NOTE
Uncontrolled.   Start lisinopril 10 mg qd.   Continue HCTZ 12.5 mg qd.   Send blood pressure readings in 1 month.     BP Readings from Last 3 Encounters:   02/12/20 146/94   05/29/19 118/84   02/19/19 140/88

## 2020-02-13 ENCOUNTER — TELEPHONE (OUTPATIENT)
Dept: INTERNAL MEDICINE | Age: 50
End: 2020-02-13

## 2020-02-13 DIAGNOSIS — E78.5 HYPERLIPIDEMIA, UNSPECIFIED HYPERLIPIDEMIA TYPE: Primary | Chronic | ICD-10-CM

## 2020-02-13 RX ORDER — ROSUVASTATIN CALCIUM 5 MG/1
5 TABLET, COATED ORAL NIGHTLY
Qty: 30 TABLET | Refills: 3 | Status: SHIPPED | OUTPATIENT
Start: 2020-02-13 | End: 2020-06-08

## 2020-02-13 NOTE — TELEPHONE ENCOUNTER
----- Message from Luis Antonio Berumen MD sent at 2/12/2020  7:44 PM EST -----  Call patient with his test result(s) and mail the results to him if MyChart is NOT active.    1. A1c for average glucose level is higher. Maintain a low sugar/starch/carbohydrate diet and exercise regularly. Wt loss advised.  Consider medication if worsening.     2. Cholesterol is worse. I would recommend statin therapy to lower risk of heart disease. Start rosuvastatin 5 mg daily with supper (hyperlipidemia). Check labs on follow-up.

## 2020-02-13 NOTE — PROGRESS NOTES
Call patient with his test result(s) and mail the results to him if MyChart is NOT active.    1. A1c for average glucose level is higher. Maintain a low sugar/starch/carbohydrate diet and exercise regularly. Wt loss advised.  Consider medication if worsening.     2. Cholesterol is worse. I would recommend statin therapy to lower risk of heart disease. Start rosuvastatin 5 mg daily with supper (hyperlipidemia). Check labs on follow-up.

## 2020-04-11 DIAGNOSIS — I10 ESSENTIAL HYPERTENSION: Chronic | ICD-10-CM

## 2020-04-12 RX ORDER — HYDROCHLOROTHIAZIDE 12.5 MG/1
TABLET ORAL
Qty: 30 TABLET | Refills: 11 | Status: SHIPPED | OUTPATIENT
Start: 2020-04-12 | End: 2021-05-17

## 2020-06-07 DIAGNOSIS — I10 ESSENTIAL HYPERTENSION: Chronic | ICD-10-CM

## 2020-06-07 DIAGNOSIS — E78.5 HYPERLIPIDEMIA, UNSPECIFIED HYPERLIPIDEMIA TYPE: Chronic | ICD-10-CM

## 2020-06-08 RX ORDER — ROSUVASTATIN CALCIUM 5 MG/1
TABLET, COATED ORAL
Qty: 30 TABLET | Refills: 2 | Status: SHIPPED | OUTPATIENT
Start: 2020-06-08 | End: 2020-09-30

## 2020-06-08 RX ORDER — LISINOPRIL 10 MG/1
TABLET ORAL
Qty: 30 TABLET | Refills: 2 | Status: SHIPPED | OUTPATIENT
Start: 2020-06-08 | End: 2020-09-30

## 2020-08-12 ENCOUNTER — TELEPHONE (OUTPATIENT)
Dept: INTERNAL MEDICINE | Age: 50
End: 2020-08-12

## 2020-08-12 NOTE — TELEPHONE ENCOUNTER
Patient calling and stated he wanted to switch his Office Visit to either a Virtual Visit or Telephone Visit. Hub was advised to send message back to clinical staff to see if this is ok as he may or may not need to have labs drawn.    Please call and advise at [phone].

## 2020-08-12 NOTE — TELEPHONE ENCOUNTER
It's his choice but he's going to need labs in any case for which he will need to come in. See what he wants to do.

## 2020-09-11 ENCOUNTER — OFFICE VISIT (OUTPATIENT)
Dept: INTERNAL MEDICINE | Age: 50
End: 2020-09-11

## 2020-09-11 VITALS
TEMPERATURE: 97.8 F | HEIGHT: 70 IN | BODY MASS INDEX: 36.08 KG/M2 | WEIGHT: 252 LBS | OXYGEN SATURATION: 98 % | HEART RATE: 76 BPM | DIASTOLIC BLOOD PRESSURE: 80 MMHG | SYSTOLIC BLOOD PRESSURE: 138 MMHG

## 2020-09-11 DIAGNOSIS — E78.5 HYPERLIPIDEMIA, UNSPECIFIED HYPERLIPIDEMIA TYPE: Primary | Chronic | ICD-10-CM

## 2020-09-11 DIAGNOSIS — I10 ESSENTIAL HYPERTENSION: Chronic | ICD-10-CM

## 2020-09-11 DIAGNOSIS — Z12.11 SCREENING FOR COLON CANCER: ICD-10-CM

## 2020-09-11 DIAGNOSIS — E66.9 OBESITY (BMI 30-39.9): Chronic | ICD-10-CM

## 2020-09-11 LAB
ALBUMIN SERPL-MCNC: 5.4 G/DL (ref 3.5–5.2)
ALBUMIN/GLOB SERPL: 3.2 G/DL
ALP SERPL-CCNC: 84 U/L (ref 39–117)
ALT SERPL-CCNC: 30 U/L (ref 1–41)
AST SERPL-CCNC: 20 U/L (ref 1–40)
BILIRUB SERPL-MCNC: 0.8 MG/DL (ref 0–1.2)
BUN SERPL-MCNC: 15 MG/DL (ref 6–20)
BUN/CREAT SERPL: 14 (ref 7–25)
CALCIUM SERPL-MCNC: 9.8 MG/DL (ref 8.6–10.5)
CHLORIDE SERPL-SCNC: 96 MMOL/L (ref 98–107)
CHOLEST SERPL-MCNC: 154 MG/DL (ref 0–200)
CHOLEST/HDLC SERPL: 2.52 {RATIO}
CO2 SERPL-SCNC: 28.9 MMOL/L (ref 22–29)
CREAT SERPL-MCNC: 1.07 MG/DL (ref 0.76–1.27)
GLOBULIN SER CALC-MCNC: 1.7 GM/DL
GLUCOSE SERPL-MCNC: 86 MG/DL (ref 65–99)
HDLC SERPL-MCNC: 61 MG/DL (ref 40–60)
LDLC SERPL CALC-MCNC: 68 MG/DL (ref 0–100)
POTASSIUM SERPL-SCNC: 4.5 MMOL/L (ref 3.5–5.2)
PROT SERPL-MCNC: 7.1 G/DL (ref 6–8.5)
SODIUM SERPL-SCNC: 136 MMOL/L (ref 136–145)
TRIGL SERPL-MCNC: 125 MG/DL (ref 0–150)
VLDLC SERPL CALC-MCNC: 25 MG/DL

## 2020-09-11 PROCEDURE — 99214 OFFICE O/P EST MOD 30 MIN: CPT | Performed by: INTERNAL MEDICINE

## 2020-09-11 PROCEDURE — 90686 IIV4 VACC NO PRSV 0.5 ML IM: CPT | Performed by: INTERNAL MEDICINE

## 2020-09-11 PROCEDURE — 90471 IMMUNIZATION ADMIN: CPT | Performed by: INTERNAL MEDICINE

## 2020-09-11 NOTE — ASSESSMENT & PLAN NOTE
Weight loss of 10 lbs.   Wt Readings from Last 3 Encounters:   09/11/20 114 kg (252 lb)   02/12/20 119 kg (262 lb)   05/29/19 116 kg (255 lb 9.6 oz)      Maintain a low sugar/starch/carbohydrate diet and exercise regularly. Wt loss advised.

## 2020-09-11 NOTE — PROGRESS NOTES
[facility] MEDIC[facility]  BITA ANN M.D.      Moose Siddiqui / 49 y.o. / male  09/11/2020      ASSESSMENT & PLAN:    Problem List Items Addressed This Visit        High    Essential hypertension (Chronic)    Current Assessment & Plan     Home blood pressure < 130 consistently. Continue lisinopril 10 mg and HCTZ 12.5 mg.          Relevant Medications    hydroCHLOROthiazide (HYDRODIURIL) 12.5 MG tablet    lisinopril (PRINIVIL,ZESTRIL) 10 MG tablet    Hyperlipidemia - Primary (Chronic)    Current Assessment & Plan     Previously started rosuvastatin 5 mg without problems. Check lab(s).          Relevant Medications    rosuvastatin (CRESTOR) 5 MG tablet    Other Relevant Orders    Lipid Panel With / Chol / HDL Ratio    Comprehensive Metabolic Panel       Low    Obesity (BMI 30-39.9) (Chronic)    Current Assessment & Plan     Weight loss of 10 lbs.   Wt Readings from Last 3 Encounters:   09/11/20 114 kg (252 lb)   02/12/20 119 kg (262 lb)   05/29/19 116 kg (255 lb 9.6 oz)      Maintain a low sugar/starch/carbohydrate diet and exercise regularly. Wt loss advised.             Other Visit Diagnoses     Screening for colon cancer        Relevant Orders    Ambulatory Referral For Screening Colonoscopy        Orders Placed This Encounter   Procedures   • Fluarix Quad >6 Months (VFC) (5868-5639)   • Lipid Panel With / Chol / HDL Ratio   • Comprehensive Metabolic Panel   • Ambulatory Referral For Screening Colonoscopy     No orders of the defined types were placed in this encounter.      Summary/Discussion:      Return in about 6 months (around 3/11/2021) for COMBINED annual physical & chronic medical.  ____________________________________________________________________    MEDICATIONS  Current Outpatient Medications   Medication Sig Dispense Refill   • hydroCHLOROthiazide (HYDRODIURIL) 12.5 MG tablet TAKE ONE TABLET BY MOUTH DAILY 30 tablet 11   • lisinopril (PRINIVIL,ZESTRIL) 10 MG tablet TAKE ONE TABLET BY MOUTH DAILY  "30 tablet 2   • rosuvastatin (CRESTOR) 5 MG tablet TAKE ONE TABLET BY MOUTH ONCE NIGHTLY 30 tablet 2     No current facility-administered medications for this visit.        VITALS    Visit Vitals  /80   Pulse 76   Temp 97.8 °F (36.6 °C)   Ht 177.8 cm (70\")   Wt 114 kg (252 lb)   SpO2 98%   BMI 36.16 kg/m²       BP Readings from Last 3 Encounters:   09/11/20 138/80   02/12/20 146/94   05/29/19 118/84     Wt Readings from Last 3 Encounters:   09/11/20 114 kg (252 lb)   02/12/20 119 kg (262 lb)   05/29/19 116 kg (255 lb 9.6 oz)      Body mass index is 36.16 kg/m².    CC:  Main reason(s) for today's visit: Follow-up for hypertension and hyperlipidemia    HPI:     Chronic essential hypertension:  Since prior visit: compliant with medication(s), checks blood pressure regularly, denies significant problems with medication(s) and SBP < 130.  Most recent in-office blood pressure readings:   BP Readings from Last 3 Encounters:   09/11/20 138/80   02/12/20 146/94   05/29/19 118/84     Chronic hyperlipidemia:  Current therapy include rosuvastatin, denies problems with medication, which was started previously.    Most recent labs:   Lab Results   Component Value Date     (H) 02/12/2020     (H) 02/19/2019     (H) 03/03/2015    HDL 54 02/12/2020    HDL 58 02/19/2019    HDL 70 (H) 03/03/2015    TRIG 194 (H) 02/12/2020    CHOLHDLRATIO 4.37 02/12/2020    CHOLHDLRATIO 3.6 02/19/2019    CHOLHDLRATIO 3.5 03/03/2015       Obesity: Weight has been decreasing since last visit, has made significant dietary improvement.  Weight loss efforts: low carb/low fat diet.  Current Body mass index is 36.16 kg/m².   Wt Readings from Last 3 Encounters:   09/11/20 114 kg (252 lb)   02/12/20 119 kg (262 lb)   05/29/19 116 kg (255 lb 9.6 oz)         Patient Care Team:  Luis Antonio Berumen MD as PCP - General (Internal Medicine)  ____________________________________________________________________      REVIEW OF SYSTEMS    Review of " Systems  As noted per HPI  Constitutional neg  Resp neg  CV neg   Neuro neg headaches     PHYSICAL EXAMINATION    Physical Exam  Constitutional  No distress  Cardiovascular Rate  normal . Rhythm: regular . Heart sounds:  Normal  Psychiatric  Alert. Judgment and thought content normal. Mood normal    REVIEWED DATA:    Labs:   Lab Results   Component Value Date     02/12/2020    K 4.4 02/12/2020    AST 19 02/12/2020    ALT 37 02/12/2020    BUN 17 02/12/2020    CREATININE 1.05 02/12/2020    CREATININE 0.87 05/29/2019    CREATININE 0.94 02/19/2019    EGFRIFNONA 74 02/12/2020    EGFRIFAFRI 90 02/12/2020       Lab Results   Component Value Date    HGBA1C 5.60 02/12/2020    HGBA1C 5.2 02/19/2019    HGBA1C 5.4 03/03/2015       Lab Results   Component Value Date     (H) 02/12/2020     (H) 02/19/2019     (H) 03/03/2015    HDL 54 02/12/2020    HDL 58 02/19/2019    HDL 70 (H) 03/03/2015    TRIG 194 (H) 02/12/2020    TRIG 141 02/19/2019    TRIG 124 03/03/2015    CHOLHDLRATIO 4.37 02/12/2020    CHOLHDLRATIO 3.6 02/19/2019    CHOLHDLRATIO 3.5 03/03/2015       Lab Results   Component Value Date    TSH 1.090 02/19/2019    FREET4 1.27 02/19/2019          Lab Results   Component Value Date    WBC 0-2 03/09/2015       Lab Results   Component Value Date    PROTEIN Negative 02/19/2019    GLUCOSEU Negative 02/19/2019    BLOODU Negative 02/19/2019    NITRITEU Negative 02/19/2019    LEUKOCYTESUR Negative 02/19/2019       Imaging:        Medical Tests:        Summary of old records / correspondence / consultant report:        Request outside records:        ALLERGIES  No Known Allergies     PFSH:     The following portions of the patient's history were reviewed and updated as appropriate: Allergies / Current Medications / Past Medical History / Surgical History / Social History / Family History    PROBLEM LIST   Patient Active Problem List   Diagnosis   • Essential hypertension   • Obesity (BMI 30-39.9)   •  Hyperlipidemia       PAST MEDICAL HISTORY  Past Medical History:   Diagnosis Date   • Hypertension        SURGICAL HISTORY  Past Surgical History:   Procedure Laterality Date   • APPENDECTOMY     • COLONOSCOPY         SOCIAL HISTORY  Social History     Socioeconomic History   • Marital status:      Spouse name: Kallie   • Number of children: 2   • Years of education: Not on file   • Highest education level: Not on file   Occupational History   • Occupation: Medical office   Tobacco Use   • Smoking status: Never Smoker   • Smokeless tobacco: Never Used   Substance and Sexual Activity   • Alcohol use: Yes     Frequency: 2-4 times a month     Drinks per session: 1 or 2   • Sexual activity: Yes   Lifestyle   • Physical activity:     Days per week: 0 days     Minutes per session: Not on file   • Stress: To some extent       FAMILY HISTORY  Family History   Problem Relation Age of Onset   • Hypertension Mother    • Other Mother         benign brain tumor   • Obesity Mother    • Colon cancer Father    • Hypertension Father    • Atrial fibrillation Father    • Obesity Father    • No Known Problems Sister    • Hypertension Brother    • Obesity Brother    • Hypertension Brother    • Depression Son         suicidality   • Diabetes Neg Hx          **Dragon Disclaimer:   Much of this encounter note is an electronic transcription/translation of spoken language to printed text. The electronic translation of spoken language may permit erroneous, or at times, nonsensical words or phrases to be inadvertently transcribed. Although I have reviewed the note for such errors, some may still exist.       Template created by Gilberto Berumen MD

## 2020-09-12 NOTE — PROGRESS NOTES
Madeline Virk, here are the result(s) of your test(s):     Cholesterol is significantly improved. Continue rosuvastatin.     Please do not hesitate to contact me if you have questions.

## 2020-09-30 DIAGNOSIS — I10 ESSENTIAL HYPERTENSION: Chronic | ICD-10-CM

## 2020-09-30 DIAGNOSIS — E78.5 HYPERLIPIDEMIA, UNSPECIFIED HYPERLIPIDEMIA TYPE: Chronic | ICD-10-CM

## 2020-09-30 RX ORDER — LISINOPRIL 10 MG/1
TABLET ORAL
Qty: 30 TABLET | Refills: 11 | Status: SHIPPED | OUTPATIENT
Start: 2020-09-30 | End: 2022-01-26 | Stop reason: SDUPTHER

## 2020-09-30 RX ORDER — ROSUVASTATIN CALCIUM 5 MG/1
TABLET, COATED ORAL
Qty: 30 TABLET | Refills: 11 | Status: SHIPPED | OUTPATIENT
Start: 2020-09-30 | End: 2022-01-26 | Stop reason: SDUPTHER

## 2020-12-03 ENCOUNTER — TELEPHONE (OUTPATIENT)
Dept: GASTROENTEROLOGY | Facility: CLINIC | Age: 50
End: 2020-12-03

## 2021-01-07 ENCOUNTER — IMMUNIZATION (OUTPATIENT)
Dept: VACCINE CLINIC | Facility: HOSPITAL | Age: 51
End: 2021-01-07

## 2021-01-07 PROCEDURE — 0001A: CPT | Performed by: INTERNAL MEDICINE

## 2021-01-07 PROCEDURE — 91300 HC SARSCOV02 VAC 30MCG/0.3ML IM: CPT | Performed by: INTERNAL MEDICINE

## 2021-01-27 ENCOUNTER — TELEPHONE (OUTPATIENT)
Dept: INTERNAL MEDICINE | Age: 51
End: 2021-01-27

## 2021-01-28 ENCOUNTER — IMMUNIZATION (OUTPATIENT)
Dept: VACCINE CLINIC | Facility: HOSPITAL | Age: 51
End: 2021-01-28

## 2021-01-28 PROCEDURE — 0002A: CPT | Performed by: INTERNAL MEDICINE

## 2021-01-28 PROCEDURE — 91300 HC SARSCOV02 VAC 30MCG/0.3ML IM: CPT | Performed by: INTERNAL MEDICINE

## 2021-05-17 DIAGNOSIS — I10 ESSENTIAL HYPERTENSION: Chronic | ICD-10-CM

## 2021-05-17 RX ORDER — HYDROCHLOROTHIAZIDE 12.5 MG/1
TABLET ORAL
Qty: 30 TABLET | Refills: 2 | Status: SHIPPED | OUTPATIENT
Start: 2021-05-17 | End: 2021-08-26

## 2021-08-26 DIAGNOSIS — I10 ESSENTIAL HYPERTENSION: Chronic | ICD-10-CM

## 2021-08-26 RX ORDER — HYDROCHLOROTHIAZIDE 12.5 MG/1
TABLET ORAL
Qty: 30 TABLET | Refills: 11 | Status: SHIPPED | OUTPATIENT
Start: 2021-08-26 | End: 2022-06-14 | Stop reason: SDUPTHER

## 2021-10-01 DIAGNOSIS — E78.5 HYPERLIPIDEMIA, UNSPECIFIED HYPERLIPIDEMIA TYPE: Chronic | ICD-10-CM

## 2021-10-01 DIAGNOSIS — I10 ESSENTIAL HYPERTENSION: Chronic | ICD-10-CM

## 2021-10-04 RX ORDER — LISINOPRIL 10 MG/1
TABLET ORAL
Qty: 30 TABLET | Refills: 11 | OUTPATIENT
Start: 2021-10-04

## 2021-10-04 RX ORDER — ROSUVASTATIN CALCIUM 5 MG/1
TABLET, COATED ORAL
Qty: 30 TABLET | Refills: 11 | OUTPATIENT
Start: 2021-10-04

## 2021-10-04 NOTE — TELEPHONE ENCOUNTER
LDTVM instructing pt to call to eri appt. Pt advised to see APRN if PCP eri is full. Advised no refills to be given until seen. MM

## 2021-12-10 ENCOUNTER — IMMUNIZATION (OUTPATIENT)
Dept: VACCINE CLINIC | Facility: HOSPITAL | Age: 51
End: 2021-12-10

## 2021-12-10 PROCEDURE — 0004A HC ADM SARSCOV2 30MCG/0.3ML BOOSTER: CPT | Performed by: INTERNAL MEDICINE

## 2021-12-10 PROCEDURE — 91300 HC SARSCOV02 VAC 30MCG/0.3ML IM: CPT | Performed by: INTERNAL MEDICINE

## 2022-01-06 DIAGNOSIS — Z12.5 ENCOUNTER FOR SCREENING FOR MALIGNANT NEOPLASM OF PROSTATE: ICD-10-CM

## 2022-01-06 DIAGNOSIS — Z00.00 PREVENTATIVE HEALTH CARE: Primary | ICD-10-CM

## 2022-01-26 ENCOUNTER — OFFICE VISIT (OUTPATIENT)
Dept: INTERNAL MEDICINE | Age: 52
End: 2022-01-26

## 2022-01-26 VITALS
WEIGHT: 277 LBS | SYSTOLIC BLOOD PRESSURE: 160 MMHG | OXYGEN SATURATION: 97 % | BODY MASS INDEX: 39.65 KG/M2 | DIASTOLIC BLOOD PRESSURE: 90 MMHG | HEIGHT: 70 IN | HEART RATE: 93 BPM | TEMPERATURE: 98.2 F

## 2022-01-26 DIAGNOSIS — I10 ESSENTIAL HYPERTENSION: Chronic | ICD-10-CM

## 2022-01-26 DIAGNOSIS — Z12.11 SCREENING FOR COLON CANCER: ICD-10-CM

## 2022-01-26 DIAGNOSIS — Z80.0 FAMILY HISTORY OF COLON CANCER: ICD-10-CM

## 2022-01-26 DIAGNOSIS — R97.20 ELEVATED PSA: ICD-10-CM

## 2022-01-26 DIAGNOSIS — E66.9 OBESITY (BMI 30-39.9): Chronic | ICD-10-CM

## 2022-01-26 DIAGNOSIS — Z00.00 ENCOUNTER FOR ANNUAL HEALTH EXAMINATION: Primary | ICD-10-CM

## 2022-01-26 DIAGNOSIS — E78.5 HYPERLIPIDEMIA, UNSPECIFIED HYPERLIPIDEMIA TYPE: Chronic | ICD-10-CM

## 2022-01-26 PROCEDURE — 99214 OFFICE O/P EST MOD 30 MIN: CPT | Performed by: INTERNAL MEDICINE

## 2022-01-26 PROCEDURE — 90471 IMMUNIZATION ADMIN: CPT | Performed by: INTERNAL MEDICINE

## 2022-01-26 PROCEDURE — 90715 TDAP VACCINE 7 YRS/> IM: CPT | Performed by: INTERNAL MEDICINE

## 2022-01-26 PROCEDURE — 99396 PREV VISIT EST AGE 40-64: CPT | Performed by: INTERNAL MEDICINE

## 2022-01-26 RX ORDER — LISINOPRIL 10 MG/1
10 TABLET ORAL DAILY
Qty: 90 TABLET | Refills: 1 | Status: SHIPPED | OUTPATIENT
Start: 2022-01-26 | End: 2022-07-01 | Stop reason: SDUPTHER

## 2022-01-26 RX ORDER — ROSUVASTATIN CALCIUM 5 MG/1
5 TABLET, COATED ORAL NIGHTLY
Qty: 90 TABLET | Refills: 1 | Status: SHIPPED | OUTPATIENT
Start: 2022-01-26 | End: 2022-07-01 | Stop reason: SDUPTHER

## 2022-01-26 NOTE — ASSESSMENT & PLAN NOTE
Elevated PSA which is new.  He has been riding [employer] recently.  Will plan to recheck total and free PSA level in 3 months.  He was advised to avoid cycling 2 weeks prior to lab.    Lab Results   Component Value Date    PSA 3.8 01/19/2022    PSA 0.94 03/03/2015

## 2022-01-26 NOTE — ASSESSMENT & PLAN NOTE
Lab Results   Component Value Date     (H) 01/19/2022    LDL 68 09/11/2020     (H) 02/12/2020    HDL 57 01/19/2022    TRIG 90 01/19/2022    CHOLHDLRATIO 3.4 01/19/2022       Has not been taking rosuvastatin.  Recheck lipid panel in 3 months 1 week prior to follow-up after restarting rosuvastatin 5 mg daily.

## 2022-01-26 NOTE — ASSESSMENT & PLAN NOTE
Blood pressure is high here and home but has not been taking lisinopril for a while.   · Will restart lisinopril 10 mg qd. Send BP readings in 1 month.   · Consider sleep study.   Maintain low sodium diet of less than 3 gm.    Weight loss advised.     BP Readings from Last 3 Encounters:   01/26/22 160/90   09/11/20 138/80   02/12/20 146/94

## 2022-01-26 NOTE — PROGRESS NOTES
"    I N T E R N A L  M E D I C I N E  J U N O H  K I M,  M D      ENCOUNTER DATE:  01/26/2022    Moose Siddiqui / 51 y.o. / male    CHIEF COMPLAINT     Annual Exam and chronic medical problems      VITALS     Visit Vitals  /90 (BP Location: Left arm)   Pulse 93   Temp 98.2 °F (36.8 °C)   Ht 177.8 cm (70\")   Wt 126 kg (277 lb)   SpO2 97%   BMI 39.75 kg/m²       BP Readings from Last 3 Encounters:   01/26/22 160/90   09/11/20 138/80   02/12/20 146/94     Wt Readings from Last 3 Encounters:   01/26/22 126 kg (277 lb)   09/11/20 114 kg (252 lb)   02/12/20 119 kg (262 lb)      Body mass index is 39.75 kg/m².    MEDICATIONS     Current Outpatient Medications on File Prior to Visit   Medication Sig Dispense Refill   • hydroCHLOROthiazide (HYDRODIURIL) 12.5 MG tablet TAKE ONE TABLET BY MOUTH DAILY 30 tablet 11   • lisinopril (PRINIVIL,ZESTRIL) 10 MG tablet    HAS NOT BEEN TAKING  TAKE ONE TABLET BY MOUTH DAILY 30 tablet 11   • rosuvastatin (CRESTOR) 5 MG tablet    HAS NOT BEEN TAKING  TAKE ONE TABLET BY MOUTH ONCE NIGHTLY 30 tablet 11     No current facility-administered medications on file prior to visit.         HISTORY OF PRESENT ILLNESS      Moose presents for annual health maintenance visit.  Chay has not been compliant on his follow-up.  He ran out of lisinopril and rosuvastatin.  He has been taking hydrochlorothiazide daily.  He has gained about 20 pounds since his last visit.  Reports high work-related stress.  He did start riding [employer] bicycle for exercise and weight loss.  He is noted to have elevated blood pressure and LDL and elevated PSA.  Denies changes in urination or family history of prostate cancer.    · General health: multiple medical problems  · Lifestyle:  · Attempting to lose weight?: No   · Diet: has not been eating as healthy  · Exercise: exercises 3 days/week  · Tobacco: Never used   · Alcohol: does not drink  · Work: Full-time  · Reproductive health:  · Sexually active?: Yes "   · Concern for STD?: No   · Sexual problems?: No problems   · Sees Urologist?: No   · Depression Screening:      PHQ-2/PHQ-9 Depression Screening 1/26/2022   Little interest or pleasure in doing things 0   Feeling down, depressed, or hopeless 0   Total Score 0         PHQ-2: 0 (Not depressed)     PHQ-9: 0 (Negative screening for depression)    Patient Care Team:  Luis Antonio Berumen MD as PCP - General (Internal Medicine)  ______________________________________________________________________    ALLERGIES  No Known Allergies     PFSH:     The following portions of the patient's history were reviewed and updated as appropriate: Allergies / Current Medications / Past Medical History / Surgical History / Social History / Family History    PROBLEM LIST   Patient Active Problem List   Diagnosis   • Essential hypertension   • Obesity (BMI 30-39.9)   • Hyperlipidemia   • Elevated PSA       PAST MEDICAL HISTORY  Past Medical History:   Diagnosis Date   • Hypertension        SURGICAL HISTORY  Past Surgical History:   Procedure Laterality Date   • APPENDECTOMY     • COLONOSCOPY  2013       SOCIAL HISTORY  Social History     Socioeconomic History   • Marital status:      Spouse name: Kallie   • Number of children: 2   Tobacco Use   • Smoking status: Never Smoker   • Smokeless tobacco: Never Used   Substance and Sexual Activity   • Alcohol use: Not Currently   • Sexual activity: Yes     Partners: Female     Birth control/protection: Post-menopausal       FAMILY HISTORY  Family History   Problem Relation Age of Onset   • Hypertension Mother    • Other Mother         benign brain tumor   • Obesity Mother    • Colon cancer Father    • Hypertension Father    • Atrial fibrillation Father    • Obesity Father    • Ovarian cancer Sister    • Hypertension Brother    • Obesity Brother    • Hypertension Brother    • Obesity Brother    • Depression Son         suicidality   • Diabetes Neg Hx    • Prostate cancer Neg Hx         IMMUNIZATION HISTORY  Immunization History   Administered Date(s) Administered   • COVID-19 (PFIZER) PURPLE CAP 01/07/2021, 01/28/2021, 12/10/2021   • Flu Vaccine Quad PF >36MO 11/15/2019   • FluLaval/Fluarix/Fluzone >6 11/15/2019, 09/11/2020   • Hepatitis A 10/29/2018, 05/08/2019   • Influenza, Unspecified 12/08/2017, 10/29/2018, 11/19/2018, 12/17/2021   • Shingrix 11/15/2019   • flucelvax quad pfs =>4 YRS 12/08/2017, 10/29/2018         REVIEW OF SYSTEMS     Review of Systems   Constitutional: Positive for unexpected weight change (GAIN OF 20 LBS). Negative for fatigue.   HENT: Negative.         SNORES   Eyes: Negative.    Respiratory: Negative.    Cardiovascular: Negative.  Negative for chest pain, palpitations and leg swelling.   Gastrointestinal: Negative.    Endocrine: Negative.         OBESITY    Genitourinary: Negative.  Negative for decreased urine volume, difficulty urinating and hematuria.   Musculoskeletal: Negative.    Skin: Negative.    Allergic/Immunologic: Negative.    Neurological: Negative.  Negative for headaches.   Hematological: Negative.  Negative for adenopathy. Does not bruise/bleed easily.   Psychiatric/Behavioral: Negative.         WORK STRESS         PHYSICAL EXAMINATION     Physical Exam  Constitutional:       General: He is not in acute distress.     Appearance: He is well-developed. He is obese.   HENT:      Head: Normocephalic and atraumatic.      Right Ear: Tympanic membrane, ear canal and external ear normal.      Left Ear: Tympanic membrane, ear canal and external ear normal.      Mouth/Throat:      Comments: ENLARGED UVULA, NARROW POSTERIOR AIRWAY  Eyes:      General: No scleral icterus.     Conjunctiva/sclera: Conjunctivae normal.      Pupils: Pupils are equal, round, and reactive to light.   Neck:      Thyroid: No thyroid mass or thyromegaly.      Vascular: No carotid bruit.      Trachea: No tracheal deviation.   Cardiovascular:      Rate and Rhythm: Normal rate and regular  rhythm.      Pulses: Normal pulses.      Heart sounds: Normal heart sounds.      Comments: No carotid bruit  Pulmonary:      Effort: Pulmonary effort is normal.      Breath sounds: Normal breath sounds.   Chest:   Breasts:      Right: No supraclavicular adenopathy.      Left: No supraclavicular adenopathy.       Abdominal:      General: There is no distension.      Palpations: Abdomen is soft. There is no mass.      Tenderness: There is no abdominal tenderness.      Hernia: No hernia is present.      Comments: Protuberant    Musculoskeletal:      Cervical back: Neck supple.      Right lower leg: No edema.      Left lower leg: No edema.   Lymphadenopathy:      Cervical: No cervical adenopathy.      Upper Body:      Right upper body: No supraclavicular adenopathy.      Left upper body: No supraclavicular adenopathy.   Skin:     General: Skin is warm.      Coloration: Skin is not jaundiced or pale.      Findings: No lesion (Negative for suspicious skin lesions/growths) or rash.      Comments: No jaundice  No suspicious skin lesions.   Irritated SK below right clavicle area   Neurological:      Mental Status: He is alert and oriented to person, place, and time.      Cranial Nerves: No cranial nerve deficit.      Motor: No abnormal muscle tone.      Deep Tendon Reflexes: Reflexes normal.   Psychiatric:         Mood and Affect: Mood normal.         Behavior: Behavior normal.         Thought Content: Thought content normal.         Judgment: Judgment normal.         REVIEWED DATA      Labs:    Lab Results   Component Value Date     01/19/2022    K 4.1 01/19/2022    CALCIUM 9.7 01/19/2022    AST 27 01/19/2022    ALT 27 01/19/2022    BUN 15 01/19/2022    CREATININE 1.01 01/19/2022    CREATININE 1.07 09/11/2020    CREATININE 1.05 02/12/2020    EGFRIFNONA 85 01/19/2022    EGFRIFAFRI 98 01/19/2022       Lab Results   Component Value Date    GLUCOSE 95 01/19/2022    HGBA1C 5.5 01/19/2022    HGBA1C 5.60 02/12/2020    HGBA1C  5.2 02/19/2019    TSH 0.988 01/19/2022    FREET4 1.34 01/19/2022       Lab Results   Component Value Date    PSA 3.8 01/19/2022    PSA 0.94 03/03/2015       Lab Results   Component Value Date    TESTOSTEROTT 392 03/03/2015    TESTFRE 11.2 03/03/2015       Lab Results   Component Value Date     (H) 01/19/2022    HDL 57 01/19/2022    TRIG 90 01/19/2022    CHOLHDLRATIO 3.4 01/19/2022       No components found for: [id number]    Lab Results   Component Value Date    WBC 6.9 01/19/2022    HGB 16.7 01/19/2022    MCV 81 01/19/2022     01/19/2022       Lab Results   Component Value Date    PROTEIN Negative 01/19/2022    GLUCOSEU Negative 01/19/2022    BLOODU Negative 01/19/2022    NITRITEU Negative 01/19/2022    LEUKOCYTESUR Negative 01/19/2022          Lab Results   Component Value Date    HEPCVIRUSABY <0.1 01/19/2022       Imaging:           Medical Tests:           ASSESSMENT & PLAN     ANNUAL WELLNESS EXAM / PHYSICAL     Other medical problems addressed today:  Problem List Items Addressed This Visit        High    Essential hypertension (Chronic)    Current Assessment & Plan     Blood pressure is high here and home but has not been taking lisinopril for a while.   · Will restart lisinopril 10 mg qd. Send BP readings in 1 month.   · Consider sleep study.   Maintain low sodium diet of less than 3 gm.    Weight loss advised.     BP Readings from Last 3 Encounters:   01/26/22 160/90   09/11/20 138/80   02/12/20 146/94             Relevant Medications    hydroCHLOROthiazide (HYDRODIURIL) 12.5 MG tablet    lisinopril (PRINIVIL,ZESTRIL) 10 MG tablet    Hyperlipidemia (Chronic)    Current Assessment & Plan     Lab Results   Component Value Date     (H) 01/19/2022    LDL 68 09/11/2020     (H) 02/12/2020    HDL 57 01/19/2022    TRIG 90 01/19/2022    CHOLHDLRATIO 3.4 01/19/2022       Has not been taking rosuvastatin.  Recheck lipid panel in 3 months 1 week prior to follow-up after restarting rosuvastatin  5 mg daily.         Relevant Medications    rosuvastatin (CRESTOR) 5 MG tablet    Other Relevant Orders    Lipid Panel With / Chol / HDL Ratio    Hepatic Function Panel    Elevated PSA    Current Assessment & Plan     Elevated PSA which is new.  He has been riding [employer] recently.  Will plan to recheck total and free PSA level in 3 months.  He was advised to avoid cycling 2 weeks prior to lab.    Lab Results   Component Value Date    PSA 3.8 01/19/2022    PSA 0.94 03/03/2015             Relevant Orders    PSA, Total & Free       Medium    Obesity (BMI 30-39.9) (Chronic)    Current Assessment & Plan     Wt Readings from Last 3 Encounters:   01/26/22 126 kg (277 lb)   09/11/20 114 kg (252 lb)   02/12/20 119 kg (262 lb)      Body mass index is 39.75 kg/m².    Significant weight gain. Maintain a low sugar/starch/carbohydrate diet and exercise regularly. Wt loss advised.             Other Visit Diagnoses     Encounter for annual health examination    -  Primary    Relevant Orders    Tdap Vaccine Greater Than or Equal To 8yo IM    Family history of colon cancer        Relevant Orders    Ambulatory Referral For Screening Colonoscopy    Screening for colon cancer        Relevant Orders    Ambulatory Referral For Screening Colonoscopy          Summary/Discussion:     ·     Next Appointment with me: Visit date not found    Return in about 3 months (around 4/26/2022) for Hypertension and elevated psa .      HEALTHCARE MAINTENANCE ISSUES       Cancer Screening:  · Colon: Initial/Next screening at age: OVERDUE and ORDERED COLONOSCOPY  · Repeat colon cancer screening: every 5 years  · Prostate: Elevated PSA, recheck PSA (total/free) in 3 months.   · Testicular: Recommended monthly self exam  · Skin: Monthly self skin examination, annual exam by health professional  · Lung: Does not meet criteria for lung cancer screening.   · Other:    Screening Labs & Tests:  · Lab results reviewed & discussed with with patient or orders placed  today.  · EKG:  · CV Screening: Lipid panel  · DEXA (75+ or risk factors):   · HEP C (If born [id number] or risk factors): Negative screen  · Other:     Immunization/Vaccinations (to be given today unless deferred by patient)  · Influenza: Patient had the flu shot this season  · Hepatitis A: Up to date  · Hepatitis B: Verify immunization records  · Tetanus/Pertussis: Administer today  · Pneumovax: Not needed at this time  · Shingles: Had 1st Shingrix, advised to complete series  · COVID: MARILYNK JOHNSON COVID: Completed primary vaccine series and booster  Lifestyle Counseling:  · Lifestyle Modifications: Attempt to lose weight, Improve dietary compliance, Increase intensity/regularity of aerobic exercise, Maintain a low sugar/carbohydrate diet, Follow a low fat, low cholesterol diet, Maintain low sodium diet (< 3 gm) for blood pressure, Make dinner the lightest meal of day, Improve sleep hygiene, Reduce exposure to stress if possible and Discussed sexual issues, safe sex practices, contraception  · Safety Issues: Always wear seatbelt, Avoid texting while driving   · Use sunscreen, regular skin examination  · Recommended annual dental/vision examination.  · Emotional/Stress/Sleep: Reviewed and  given when appropriate      Health Maintenance   Topic Date Due   • COLORECTAL CANCER SCREENING  Never done   • TDAP/TD VACCINES (1 - Tdap) Never done   • ZOSTER VACCINE (2 of 2) 01/10/2020   • PROSTATE CANCER SCREENING  01/19/2023   • LIPID PANEL  01/19/2023   • ANNUAL PHYSICAL  01/27/2023   • HEPATITIS C SCREENING  Completed   • COVID-19 Vaccine  Completed   • INFLUENZA VACCINE  Completed   • Pneumococcal Vaccine 0-64  Aged Out           *Examiner was wearing KN95 mask and eye protection during the entire duration of the visit. Patient was masked the entire time. Minimum social distance of 6 ft maintained entire visit except if physical contact was necessary as documented.       Template created by Gilberto Berumen MD

## 2022-01-26 NOTE — ASSESSMENT & PLAN NOTE
Wt Readings from Last 3 Encounters:   01/26/22 126 kg (277 lb)   09/11/20 114 kg (252 lb)   02/12/20 119 kg (262 lb)      Body mass index is 39.75 kg/m².    Significant weight gain. Maintain a low sugar/starch/carbohydrate diet and exercise regularly. Wt loss advised.

## 2022-01-26 NOTE — PATIENT INSTRUCTIONS
** IMPORTANT MESSAGE FROM DR. ANN **    In our office, your satisfaction is VERY important to us.     You may receive a survey from Gabriella Estrada by mail or E-mail for you to provide feedback about your visit. This information is invaluable for me to know what we can do to improve our services.     I ask that you please take a few minutes to complete the survey and let us know how we are doing in serving your needs. (You may receive the survey more than once for multiple visits)    Thank You !    Dr. Ann    _________________________________________________________________________________________________________________________      ** ADDITIONAL INSTRUCTION / REMINDERS FROM DR. ANN **    · Get your 2nd Shingrix shot  · Work on weight loss. Goal 15-20 lbs over 3 months.   · Keep follow-up for 3 months. Labs 1 week prior to visit.

## 2022-02-24 ENCOUNTER — PREP FOR SURGERY (OUTPATIENT)
Dept: OTHER | Facility: HOSPITAL | Age: 52
End: 2022-02-24

## 2022-02-24 DIAGNOSIS — Z80.0 FAMILY HISTORY OF COLON CANCER: Primary | ICD-10-CM

## 2022-03-04 PROBLEM — Z80.0 FAMILY HISTORY OF COLON CANCER: Status: ACTIVE | Noted: 2022-03-04

## 2022-05-13 ENCOUNTER — HOSPITAL ENCOUNTER (OUTPATIENT)
Facility: HOSPITAL | Age: 52
Setting detail: HOSPITAL OUTPATIENT SURGERY
Discharge: HOME | End: 2022-05-13
Attending: SURGERY | Admitting: SURGERY

## 2022-05-13 ENCOUNTER — ANESTHESIA (OUTPATIENT)
Dept: GASTROENTEROLOGY | Facility: HOSPITAL | Age: 52
End: 2022-05-13

## 2022-05-13 ENCOUNTER — ANESTHESIA EVENT (OUTPATIENT)
Dept: GASTROENTEROLOGY | Facility: HOSPITAL | Age: 52
End: 2022-05-13

## 2022-05-13 VITALS
HEIGHT: 69 IN | WEIGHT: 269.6 LBS | DIASTOLIC BLOOD PRESSURE: 71 MMHG | TEMPERATURE: 97.6 F | RESPIRATION RATE: 16 BRPM | OXYGEN SATURATION: 94 % | SYSTOLIC BLOOD PRESSURE: 117 MMHG | HEART RATE: 71 BPM | BODY MASS INDEX: 39.93 KG/M2

## 2022-05-13 DIAGNOSIS — Z80.0 FAMILY HISTORY OF COLON CANCER: ICD-10-CM

## 2022-05-13 PROCEDURE — 88305 TISSUE EXAM BY PATHOLOGIST: CPT | Performed by: SURGERY

## 2022-05-13 PROCEDURE — 25010000002 PROPOFOL 10 MG/ML EMULSION: Performed by: ANESTHESIOLOGY

## 2022-05-13 PROCEDURE — 45380 COLONOSCOPY AND BIOPSY: CPT | Performed by: SURGERY

## 2022-05-13 PROCEDURE — S0260 H&P FOR SURGERY: HCPCS | Performed by: SURGERY

## 2022-05-13 RX ORDER — SODIUM CHLORIDE 0.9 % (FLUSH) 0.9 %
10 SYRINGE (ML) INJECTION EVERY 12 HOURS SCHEDULED
Status: DISCONTINUED | OUTPATIENT
Start: 2022-05-13 | End: 2022-05-13 | Stop reason: HOSPADM

## 2022-05-13 RX ORDER — LIDOCAINE HYDROCHLORIDE 20 MG/ML
INJECTION, SOLUTION INFILTRATION; PERINEURAL AS NEEDED
Status: DISCONTINUED | OUTPATIENT
Start: 2022-05-13 | End: 2022-05-13 | Stop reason: SURG

## 2022-05-13 RX ORDER — PROPOFOL 10 MG/ML
VIAL (ML) INTRAVENOUS AS NEEDED
Status: DISCONTINUED | OUTPATIENT
Start: 2022-05-13 | End: 2022-05-13 | Stop reason: SURG

## 2022-05-13 RX ORDER — SODIUM CHLORIDE 0.9 % (FLUSH) 0.9 %
10 SYRINGE (ML) INJECTION AS NEEDED
Status: DISCONTINUED | OUTPATIENT
Start: 2022-05-13 | End: 2022-05-13 | Stop reason: HOSPADM

## 2022-05-13 RX ORDER — SODIUM CHLORIDE, SODIUM LACTATE, POTASSIUM CHLORIDE, CALCIUM CHLORIDE 600; 310; 30; 20 MG/100ML; MG/100ML; MG/100ML; MG/100ML
30 INJECTION, SOLUTION INTRAVENOUS CONTINUOUS PRN
Status: DISCONTINUED | OUTPATIENT
Start: 2022-05-13 | End: 2022-05-13 | Stop reason: HOSPADM

## 2022-05-13 RX ADMIN — LIDOCAINE HYDROCHLORIDE 60 MG: 20 INJECTION, SOLUTION INFILTRATION; PERINEURAL at 07:32

## 2022-05-13 RX ADMIN — PROPOFOL 140 MCG/KG/MIN: 10 INJECTION, EMULSION INTRAVENOUS at 07:34

## 2022-05-13 RX ADMIN — PROPOFOL 50 MG: 10 INJECTION, EMULSION INTRAVENOUS at 07:33

## 2022-05-13 RX ADMIN — SODIUM CHLORIDE, POTASSIUM CHLORIDE, SODIUM LACTATE AND CALCIUM CHLORIDE 30 ML/HR: 600; 310; 30; 20 INJECTION, SOLUTION INTRAVENOUS at 07:20

## 2022-05-13 NOTE — ANESTHESIA PREPROCEDURE EVALUATION
Anesthesia Evaluation     Patient summary reviewed and Nursing notes reviewed   NPO Solid Status: > 8 hours  NPO Liquid Status: > 2 hours           Airway   Mallampati: II  TM distance: >3 FB  Neck ROM: full  Dental - normal exam     Pulmonary - negative pulmonary ROS and normal exam   Cardiovascular - normal exam    (+) hypertension 2 medications or greater, hyperlipidemia,       Neuro/Psych- negative ROS  GI/Hepatic/Renal/Endo    (+) morbid obesity,      Musculoskeletal (-) negative ROS    Abdominal    Substance History - negative use     OB/GYN negative ob/gyn ROS         Other                        Anesthesia Plan    ASA 3     MAC       Anesthetic plan, all risks, benefits, and alternatives have been provided, discussed and informed consent has been obtained with: patient.        CODE STATUS:

## 2022-05-13 NOTE — ANESTHESIA POSTPROCEDURE EVALUATION
"Patient: Moose Siddiqui    Procedure Summary     Date: 05/13/22 Room / Location: [facility] ENDOSCOPY 7 /  [facility]    Anesthesia Start: 0728 Anesthesia Stop: 0756    Procedure: COLONOSCOPY to cecum with cold bx polypectomies (N/A ) Diagnosis:       Family history of colon cancer      (Family history of colon cancer [Z80.0])    Surgeons: Juan Francisco Mccallum Jr., MD Provider: Jose Mcqueen MD    Anesthesia Type: MAC ASA Status: 3          Anesthesia Type: MAC    Vitals  Vitals Value Taken Time   /67 05/13/22 0754   Temp     Pulse 87 05/13/22 0754   Resp 16 05/13/22 0754   SpO2 96 % 05/13/22 0754           Post Anesthesia Care and Evaluation    Patient location during evaluation: bedside  Patient participation: complete - patient participated  Level of consciousness: awake  Pain score: 2  Pain management: adequate  Airway patency: patent  Anesthetic complications: No anesthetic complications  PONV Status: none  Cardiovascular status: acceptable  Respiratory status: acceptable  Hydration status: acceptable    Comments: /67 (BP Location: Left arm, Patient Position: Lying)   Pulse 87   Temp 36.4 °C (97.6 °F) (Oral)   Resp 16   Ht 175.3 cm (69\")   Wt 122 kg (269 lb 9.6 oz)   SpO2 96%   BMI 39.81 kg/m²         "

## 2022-05-13 NOTE — DISCHARGE INSTRUCTIONS
For the next 24 hours patient needs to be with a responsible adult.    For 24 hours DO NOT drive, operate machinery, appliances, drink alcohol, make important decisions or sign legal documents.    Start with a light or bland diet if you are feeling sick to your stomach otherwise advance to regular diet as tolerated.    Follow recommendations on procedure report if provided by your doctor.    Call Dr Mccallum for problems [phone]    Problems may include but not limited to: large amounts of bleeding, trouble breathing, repeated vomiting, severe unrelieved pain, fever or chills.

## 2022-05-13 NOTE — H&P
[facility]   HISTORY AND PHYSICAL    Patient Name: Moose Siddiqui  : 1970  MRN: [id number]  Primary Care Physician:  Luis Antonio Berumen MD  Date of admission: 2022    Subjective   Subjective     Chief Complaint: Family history of colon cancer    History of Present Illness     The patient is a very pleasant 51-year-old male who has a family history of colon cancer.  He had a colonoscopy 8 years ago at which time polyps were identified and he underwent polypectomies.  He has not had any significant GI symptoms since that time.    Review of Systems   Constitutional: Negative for fatigue and fever.   Respiratory: Negative for chest tightness and shortness of breath.    Cardiovascular: Negative for chest pain and palpitations.   Gastrointestinal: Negative for abdominal pain, blood in stool, constipation, diarrhea, nausea and vomiting.        Personal History     Past Medical History:   Diagnosis Date   • Hypertension        Past Surgical History:   Procedure Laterality Date   • APPENDECTOMY     • COLONOSCOPY         Family History: family history includes Atrial fibrillation in his father; Colon cancer in his father; Depression in his son; Hypertension in his brother, brother, father, and mother; Obesity in his brother, brother, father, and mother; Other in his mother; Ovarian cancer in his sister. Otherwise pertinent FHx was reviewed and not pertinent to current issue.    Social History:  reports that he has never smoked. He has never used smokeless tobacco. He reports previous alcohol use.    Home Medications:  hydroCHLOROthiazide, lisinopril, and rosuvastatin    Allergies:  No Known Allergies    Objective    Objective     Vitals:   Temp:  [97.6 °F (36.4 °C)] 97.6 °F (36.4 °C)  Heart Rate:  [84] 84  Resp:  [16] 16  BP: (146)/(91) 146/91    Physical Exam  Constitutional:       Appearance: Normal appearance. He is well-developed. He is not toxic-appearing.   Eyes:      General: No scleral  icterus.  Pulmonary:      Effort: Pulmonary effort is normal. No respiratory distress.   Abdominal:      Palpations: Abdomen is soft.      Tenderness: There is no abdominal tenderness.   Skin:     General: Skin is warm and dry.   Neurological:      Mental Status: He is alert and oriented to person, place, and time.   Psychiatric:         Behavior: Behavior normal.         Thought Content: Thought content normal.         Judgment: Judgment normal.         Assessment & Plan   Assessment / Plan     Brief Patient Summary:  Moose Siddiqui is a 51 y.o. male who has a family history of colon cancer and a personal history of colon polyps.    Active Hospital Problems:  Active Hospital Problems    Diagnosis    • **Family history of colon cancer      Added automatically from request for surgery [id number]       Plan: Colonoscopy.  The patient understands the indications, alternatives, risks, and benefits of the procedure and wishes to proceed.      Juan Francisco Mccallum Jr., MD

## 2022-05-13 NOTE — OP NOTE
Surgeon:     Juan Francisco Mccallum Jr., M.D.    Preoperative Diagnosis:     1.  Family history colon cancer  2.  History of colon polyps    Postoperative Diagnosis:     1.  Diverticulosis  2.  Rectosigmoid polyp x2    Procedure Performed:     1.  Colonoscopy with biopsy of colon polyp x2    Indications:     The patient is a pleasant 51-year-old male with a family history of colon cancer and personal history of colon polyps.  He presents for screening colonoscopy in high risk group.  The patient understands the indications, alternatives, risks, and benefits of the procedure and wishes to proceed.    Procedure:     The patient was identified, taken to the endoscopy suite, and place in the left side down decubitus procedure.  The patient underwent a MAC anesthesia and was appropriately monitored through the case by the anesthesia personnel.  A rectal exam was performed that was normal.  The colonoscope was introduced into the rectum and advanced very carefully to the cecum that was identified by the cecal strap, ileocecal valve, and the appendiceal orifice.  The scope was then slowly withdrawn with careful circumferential examination of the mucosa performed.  The bowel prep was good allowing adequate visualization of mucosal surfaces.  The scope was withdrawn.  The patient tolerated the procedure well and was transferred the recovery area in stable condition.    Findings:    There was diverticulosis involving the sigmoid colon.    There were 2 separate 4 mm polyps in the rectosigmoid that removed by cold biopsy forceps and retrieved.    Recommendations:     1.  High-fiber diet.  2.  Await pathology results from polypectomies.  3.  Repeat colonoscopy in 5 years.    Juan Francisco Mccallum Jr., M.D.

## 2022-05-17 LAB
LAB AP CASE REPORT: NORMAL
PATH REPORT.FINAL DX SPEC: NORMAL
PATH REPORT.GROSS SPEC: NORMAL

## 2022-06-01 ENCOUNTER — TELEPHONE (OUTPATIENT)
Dept: SURGERY | Facility: CLINIC | Age: 52
End: 2022-06-01

## 2022-06-01 NOTE — TELEPHONE ENCOUNTER
----- Message from Juan Francisco Mccallum Jr., MD sent at 5/27/2022  9:50 AM EDT -----  Please contact this patient to inform that the polyp is benign and a repeat colonoscopy is needed in 5 years.  Please place in recall for a colonoscopy in 5 years.  Thanks.

## 2022-06-01 NOTE — TELEPHONE ENCOUNTER
Spoke with patient and gave his pathology results from his colonoscopy. Advised he will need a repeat colonoscopy in 5 yrs. He verbalized understanding. Placed in recall for cscope in 5 yrs.

## 2022-06-03 ENCOUNTER — OFFICE VISIT (OUTPATIENT)
Dept: INTERNAL MEDICINE | Age: 52
End: 2022-06-03

## 2022-06-03 VITALS
OXYGEN SATURATION: 97 % | SYSTOLIC BLOOD PRESSURE: 120 MMHG | TEMPERATURE: 97.3 F | BODY MASS INDEX: 41.62 KG/M2 | HEIGHT: 69 IN | WEIGHT: 281 LBS | HEART RATE: 104 BPM | DIASTOLIC BLOOD PRESSURE: 84 MMHG

## 2022-06-03 DIAGNOSIS — E66.01 MORBID OBESITY DUE TO EXCESS CALORIES: Chronic | ICD-10-CM

## 2022-06-03 DIAGNOSIS — E78.00 PURE HYPERCHOLESTEROLEMIA: Chronic | ICD-10-CM

## 2022-06-03 DIAGNOSIS — I10 ESSENTIAL HYPERTENSION: Chronic | ICD-10-CM

## 2022-06-03 DIAGNOSIS — R97.20 INCREASED PROSTATE SPECIFIC ANTIGEN (PSA) VELOCITY: Primary | ICD-10-CM

## 2022-06-03 PROCEDURE — 99214 OFFICE O/P EST MOD 30 MIN: CPT | Performed by: INTERNAL MEDICINE

## 2022-06-03 NOTE — PROGRESS NOTES
Hypertension is significantly better back on lisinopril.    I N T E R N A L  M E D I C I N E  J U N O H  K I MNICHOLAS D      ENCOUNTER DATE:  06/03/2022    Moose Siddiqui / 51 y.o. / male      CHIEF COMPLAINT / REASON FOR OFFICE VISIT     Hypertension, Hyperlipidemia, and Increased PSA velocity      ASSESSMENT & PLAN     Problem List Items Addressed This Visit        High    Essential hypertension (Chronic)    Overview     Continue lisinopril 10 mg and hydrochlorothiazide 12.5 mg daily           Current Assessment & Plan     Blood pressure is improved.  Continue lisinopril and hydrochlorothiazide.  BP Readings from Last 3 Encounters:   06/03/22 120/84   05/13/22 117/71   01/26/22 160/90               Relevant Medications    hydroCHLOROthiazide (HYDRODIURIL) 12.5 MG tablet    lisinopril (PRINIVIL,ZESTRIL) 10 MG tablet    Hyperlipidemia (Chronic)    Overview     Continue rosuvastatin 5 mg daily           Current Assessment & Plan     Cholesterol is significantly better back on rosuvastatin 5 mg.           Relevant Medications    rosuvastatin (CRESTOR) 5 MG tablet    Increased prostate specific antigen (PSA) velocity - Primary (Chronic)    Current Assessment & Plan     Lab Results   Component Value Date    PSA 4.4 (H) 05/27/2022    PSA 3.8 01/19/2022    PSA 0.94 03/03/2015      PSA is still rising. Referral to urologist.            Relevant Orders    Ambulatory Referral to Urology (Completed)       Medium    Morbid obesity due to excess calories (HCC) (Chronic)    Current Assessment & Plan     Wt Readings from Last 3 Encounters:   06/03/22 127 kg (281 lb)   05/13/22 122 kg (269 lb 9.6 oz)   01/26/22 126 kg (277 lb)     Body mass index is 41.5 kg/m².    Weight continues to rise.  Maintain low sugar/carbohydrate diet.  Increase physical activity.  Consider medication for obesity.               Orders Placed This Encounter   Procedures   • Ambulatory Referral to Urology     No orders of the defined types were placed  "in this encounter.      SUMMARY/DISCUSSION  •       Next Appointment with me: Visit date not found    Return in about 6 months (around 12/3/2022) for Hypertension, Hyperlipidemia.      VITAL SIGNS     Vitals:    06/03/22 0850   BP: 120/84   BP Location: Left arm   Pulse: 104   Temp: 97.3 °F (36.3 °C)   SpO2: 97%   Weight: 127 kg (281 lb)   Height: 175.3 cm (69\")       BP Readings from Last 3 Encounters:   06/03/22 120/84   05/13/22 117/71   01/26/22 160/90     Wt Readings from Last 3 Encounters:   06/03/22 127 kg (281 lb)   05/13/22 122 kg (269 lb 9.6 oz)   01/26/22 126 kg (277 lb)     Body mass index is 41.5 kg/m².    Blood pressure readings recorded on patient flowsheet:  No flowsheet data found.       MEDICATIONS AT THE TIME OF OFFICE VISIT     Current Outpatient Medications on File Prior to Visit   Medication Sig   • hydroCHLOROthiazide (HYDRODIURIL) 12.5 MG tablet TAKE ONE TABLET BY MOUTH DAILY   • lisinopril (PRINIVIL,ZESTRIL) 10 MG tablet Take 1 tablet by mouth Daily.   • rosuvastatin (CRESTOR) 5 MG tablet Take 1 tablet by mouth Every Night.     No current facility-administered medications on file prior to visit.          HISTORY OF PRESENT ILLNESS     Hypertension is well controlled now back on lisinopril and hydrochlorothiazide.  Hyperlipidemia is significantly improved back on rosuvastatin 5 mg.  PSA level continues to rise.  Denies any prostatic symptoms or family history.      Patient Care Team:  Luis Antonio Berumen MD as PCP - General (Internal Medicine)    REVIEW OF SYSTEMS     Review of Systems   Weight gain   Cardiovascular: Normal rate, regular rhythm.   negative for change in urination or hematuria     PHYSICAL EXAMINATION     Physical Exam  Morbid obesity  Alert with normal thought and judgment.       REVIEWED DATA     Labs:     Lab Results   Component Value Date     01/19/2022    K 4.1 01/19/2022    CALCIUM 9.7 01/19/2022    AST 18 05/27/2022    ALT 25 05/27/2022    BUN 15 01/19/2022    " CREATININE 1.01 01/19/2022    CREATININE 1.07 09/11/2020    CREATININE 1.05 02/12/2020    EGFRIFNONA 85 01/19/2022    EGFRIFAFRI 98 01/19/2022       Lab Results   Component Value Date    HGBA1C 5.5 01/19/2022    HGBA1C 5.60 02/12/2020    HGBA1C 5.2 02/19/2019       Lab Results   Component Value Date    LDL 80 05/27/2022     (H) 01/19/2022    LDL 68 09/11/2020    HDL 58 05/27/2022    HDL 57 01/19/2022    TRIG 128 05/27/2022    TRIG 90 01/19/2022       Lab Results   Component Value Date    TSH 0.988 01/19/2022    TSH 1.090 02/19/2019    TSH 1.17 03/03/2015    FREET4 1.34 01/19/2022    FREET4 1.27 02/19/2019    FREET4 1.20 03/03/2015       Lab Results   Component Value Date    WBC 6.9 01/19/2022    HGB 16.7 01/19/2022     01/19/2022     Lab Results   Component Value Date    PSA 4.4 (H) 05/27/2022    PSA 3.8 01/19/2022    PSA 0.94 03/03/2015      No results found for: MALBCRERATIO       Imaging:           Medical Tests:           Summary of old records / correspondence / consultant report:           Request outside records:             *Examiner was wearing KN95 mask and eye protection during the entire duration of the visit. Patient was masked the entire time. Minimum social distance of 6 ft maintained entire visit except if physical contact was necessary as documented.       Template created by Gilberto Berumen MD

## 2022-06-03 NOTE — PATIENT INSTRUCTIONS
** IMPORTANT MESSAGE FROM DR. ANN **    In our office, your satisfaction is VERY important to us.     You may receive a survey from Gabriella Estrada by mail or E-mail for you to provide feedback about your visit. This information is invaluable for me to know what we can do to improve our services.     I ask that you please take a few minutes to complete the survey and let us know how we are doing in serving your needs. (You may receive the survey more than once for multiple visits)    Thank You !    Dr. Ann    _________________________________________________________________________________________________________________________      ** ADDITIONAL INSTRUCTION / REMINDERS FROM DR. ANN **    See urologist for elevated PSA velocity  Work on weight loss goal of at least 20 lbs in 6 months.

## 2022-06-03 NOTE — ASSESSMENT & PLAN NOTE
Blood pressure is improved.  Continue lisinopril and hydrochlorothiazide.  BP Readings from Last 3 Encounters:   06/03/22 120/84   05/13/22 117/71   01/26/22 160/90

## 2022-06-03 NOTE — ASSESSMENT & PLAN NOTE
Lab Results   Component Value Date    PSA 4.4 (H) 05/27/2022    PSA 3.8 01/19/2022    PSA 0.94 03/03/2015      PSA is still rising. Referral to urologist.

## 2022-06-03 NOTE — ASSESSMENT & PLAN NOTE
Wt Readings from Last 3 Encounters:   06/03/22 127 kg (281 lb)   05/13/22 122 kg (269 lb 9.6 oz)   01/26/22 126 kg (277 lb)     Body mass index is 41.5 kg/m².    Weight continues to rise.  Maintain low sugar/carbohydrate diet.  Increase physical activity.  Consider medication for obesity.

## 2022-06-14 DIAGNOSIS — I10 ESSENTIAL HYPERTENSION: Chronic | ICD-10-CM

## 2022-06-14 RX ORDER — HYDROCHLOROTHIAZIDE 12.5 MG/1
12.5 TABLET ORAL DAILY
Qty: 90 TABLET | Refills: 3 | Status: SHIPPED | OUTPATIENT
Start: 2022-06-14

## 2022-06-24 ENCOUNTER — IMMUNIZATION (OUTPATIENT)
Dept: VACCINE CLINIC | Facility: HOSPITAL | Age: 52
End: 2022-06-24

## 2022-06-24 DIAGNOSIS — Z23 NEED FOR VACCINATION: Primary | ICD-10-CM

## 2022-06-24 PROCEDURE — 0054A HC ADM SARSCV2 30MCG TRS-SUCR BOOSTER: CPT | Performed by: INTERNAL MEDICINE

## 2022-06-24 PROCEDURE — 91305 HC SARSCOV2 VAC 30 MCG TRS-SUCR PFIZER: CPT | Performed by: INTERNAL MEDICINE

## 2022-07-01 DIAGNOSIS — E78.5 HYPERLIPIDEMIA, UNSPECIFIED HYPERLIPIDEMIA TYPE: Chronic | ICD-10-CM

## 2022-07-01 DIAGNOSIS — I10 ESSENTIAL HYPERTENSION: Chronic | ICD-10-CM

## 2022-07-01 RX ORDER — ROSUVASTATIN CALCIUM 5 MG/1
5 TABLET, COATED ORAL NIGHTLY
Qty: 90 TABLET | Refills: 0 | Status: SHIPPED | OUTPATIENT
Start: 2022-07-01 | End: 2022-09-30

## 2022-07-01 RX ORDER — LISINOPRIL 10 MG/1
10 TABLET ORAL DAILY
Qty: 90 TABLET | Refills: 0 | Status: SHIPPED | OUTPATIENT
Start: 2022-07-01 | End: 2022-09-30

## 2022-09-29 DIAGNOSIS — I10 ESSENTIAL HYPERTENSION: Chronic | ICD-10-CM

## 2022-09-29 DIAGNOSIS — E78.5 HYPERLIPIDEMIA, UNSPECIFIED HYPERLIPIDEMIA TYPE: Chronic | ICD-10-CM

## 2022-09-30 RX ORDER — ROSUVASTATIN CALCIUM 5 MG/1
TABLET, COATED ORAL
Qty: 90 TABLET | Refills: 0 | Status: SHIPPED | OUTPATIENT
Start: 2022-09-30 | End: 2023-01-27 | Stop reason: SDUPTHER

## 2022-09-30 RX ORDER — LISINOPRIL 10 MG/1
TABLET ORAL
Qty: 90 TABLET | Refills: 0 | Status: SHIPPED | OUTPATIENT
Start: 2022-09-30 | End: 2022-12-09

## 2022-11-01 DIAGNOSIS — R97.20 INCREASED PROSTATE SPECIFIC ANTIGEN (PSA) VELOCITY: Primary | Chronic | ICD-10-CM

## 2022-12-09 ENCOUNTER — TELEPHONE (OUTPATIENT)
Dept: INTERNAL MEDICINE | Age: 52
End: 2022-12-09

## 2022-12-09 ENCOUNTER — OFFICE VISIT (OUTPATIENT)
Dept: INTERNAL MEDICINE | Age: 52
End: 2022-12-09

## 2022-12-09 VITALS
TEMPERATURE: 97.9 F | HEART RATE: 99 BPM | BODY MASS INDEX: 43.99 KG/M2 | SYSTOLIC BLOOD PRESSURE: 144 MMHG | HEIGHT: 69 IN | WEIGHT: 297 LBS | DIASTOLIC BLOOD PRESSURE: 90 MMHG | OXYGEN SATURATION: 98 %

## 2022-12-09 DIAGNOSIS — E78.00 PURE HYPERCHOLESTEROLEMIA: Chronic | ICD-10-CM

## 2022-12-09 DIAGNOSIS — C61 PROSTATE CANCER: Primary | Chronic | ICD-10-CM

## 2022-12-09 DIAGNOSIS — E66.01 MORBID OBESITY DUE TO EXCESS CALORIES: Chronic | ICD-10-CM

## 2022-12-09 DIAGNOSIS — I10 ESSENTIAL HYPERTENSION: Chronic | ICD-10-CM

## 2022-12-09 LAB
ALBUMIN SERPL-MCNC: 5 G/DL (ref 3.5–5.2)
ALBUMIN/GLOB SERPL: 2.8 G/DL
ALP SERPL-CCNC: 71 U/L (ref 39–117)
ALT SERPL-CCNC: 31 U/L (ref 1–41)
AST SERPL-CCNC: 22 U/L (ref 1–40)
BILIRUB SERPL-MCNC: 0.4 MG/DL (ref 0–1.2)
BUN SERPL-MCNC: 17 MG/DL (ref 6–20)
BUN/CREAT SERPL: 17.9 (ref 7–25)
CALCIUM SERPL-MCNC: 10 MG/DL (ref 8.6–10.5)
CHLORIDE SERPL-SCNC: 101 MMOL/L (ref 98–107)
CO2 SERPL-SCNC: 29.1 MMOL/L (ref 22–29)
CREAT SERPL-MCNC: 0.95 MG/DL (ref 0.76–1.27)
EGFRCR SERPLBLD CKD-EPI 2021: 95.1 ML/MIN/1.73
GLOBULIN SER CALC-MCNC: 1.8 GM/DL
GLUCOSE SERPL-MCNC: 110 MG/DL (ref 65–99)
HBA1C MFR BLD: 5.7 % (ref 4.8–5.6)
POTASSIUM SERPL-SCNC: 4.1 MMOL/L (ref 3.5–5.2)
PROT SERPL-MCNC: 6.8 G/DL (ref 6–8.5)
SODIUM SERPL-SCNC: 140 MMOL/L (ref 136–145)

## 2022-12-09 PROCEDURE — 90686 IIV4 VACC NO PRSV 0.5 ML IM: CPT | Performed by: INTERNAL MEDICINE

## 2022-12-09 PROCEDURE — 99214 OFFICE O/P EST MOD 30 MIN: CPT | Performed by: INTERNAL MEDICINE

## 2022-12-09 PROCEDURE — 90471 IMMUNIZATION ADMIN: CPT | Performed by: INTERNAL MEDICINE

## 2022-12-09 RX ORDER — LISINOPRIL 20 MG/1
20 TABLET ORAL DAILY
Qty: 90 TABLET | Refills: 1 | Status: SHIPPED | OUTPATIENT
Start: 2022-12-09 | End: 2023-02-07 | Stop reason: SDUPTHER

## 2022-12-09 NOTE — PROGRESS NOTES
I N T E R N A L  M E D I C I N E    J U N O H  K I M,  M D      ENCOUNTER DATE:  12/09/2022    Moose HARO Siddiqui / 52 y.o. / male    CHIEF COMPLAINT / REASON FOR OFFICE VISIT     Hypertension, Hyperlipidemia, and Newly diagnosed prostate cancer      ASSESSMENT & PLAN     Problem List Items Addressed This Visit        High    Prostate cancer (HCC) - Primary (Chronic)    Current Assessment & Plan     Saw Dr. De Dios. Not convinced on direction to take (active surveillance vs treatment).   Recommended going to [facility] for further assessment / 2nd opinion.   He was in agreement with this plan.   He will self refer to [facility].     Lab Results   Component Value Date    PSA 4.0 11/28/2022    PSA 4.4 (H) 05/27/2022    PSA 3.8 01/19/2022                Medium    Essential hypertension (Chronic)    Current Assessment & Plan     Increase lisinopril to 20 mg qd. Continue HCTZ 12.5 mg qd.   Weight loss advised  Maintain low sodium diet of less than 2.5 grams daily.   Goal blood pressure < 130/80.     BP Readings from Last 3 Encounters:   12/09/22 144/90   06/03/22 120/84   05/13/22 117/71             Relevant Medications    hydroCHLOROthiazide (HYDRODIURIL) 12.5 MG tablet    lisinopril (PRINIVIL,ZESTRIL) 20 MG tablet    Other Relevant Orders    Comprehensive Metabolic Panel    Morbid obesity due to excess calories (HCC) (Chronic)    Current Assessment & Plan     Notable weight gain due to poor lifestyle choices.  Maintain a low sugar/starch/carbohydrate diet.   Increase activity.   On follow-up consider medication for weight loss.     Wt Readings from Last 3 Encounters:   12/09/22 135 kg (297 lb)   06/03/22 127 kg (281 lb)   05/13/22 122 kg (269 lb 9.6 oz)     Body mass index is 43.86 kg/m².            Relevant Orders    Hemoglobin A1c    Hyperlipidemia (Chronic)    Overview     Continue rosuvastatin 5 mg daily         Relevant Medications    rosuvastatin (CRESTOR) 5 MG tablet    Other Relevant Orders    Comprehensive Metabolic  "Panel     Orders Placed This Encounter   Procedures   • FluLaval/Fluzone >6 mos ([id number])   • Comprehensive Metabolic Panel   • Hemoglobin A1c     New Medications Ordered This Visit   Medications   • lisinopril (PRINIVIL,ZESTRIL) 20 MG tablet     Sig: Take 1 tablet by mouth Daily.     Dispense:  90 tablet     Refill:  1       SUMMARY/DISCUSSION  •       Next Appointment with me: Visit date not found    Return in about 3 months (around 3/9/2023) for Reassess chronic medical problems.      VITAL SIGNS     Vitals:    12/09/22 0730   BP: 144/90   Pulse: 99   Temp: 97.9 °F (36.6 °C)   SpO2: 98%   Weight: 135 kg (297 lb)   Height: 175.3 cm (69\")       BP Readings from Last 3 Encounters:   12/09/22 144/90   06/03/22 120/84   05/13/22 117/71     Wt Readings from Last 3 Encounters:   12/09/22 135 kg (297 lb)   06/03/22 127 kg (281 lb)   05/13/22 122 kg (269 lb 9.6 oz)     Body mass index is 43.86 kg/m².    Blood pressure readings recorded on patient flowsheet:  No flowsheet data found.       MEDICATIONS AT THE TIME OF OFFICE VISIT     Current Outpatient Medications on File Prior to Visit   Medication Sig   • hydroCHLOROthiazide (HYDRODIURIL) 12.5 MG tablet Take 1 tablet by mouth Daily.   • rosuvastatin (CRESTOR) 5 MG tablet TAKE 1 TABLET EVERY NIGHT   • [DISCONTINUED] lisinopril (PRINIVIL,ZESTRIL) 10 MG tablet TAKE 1 TABLET DAILY     No current facility-administered medications on file prior to visit.          HISTORY OF PRESENT ILLNESS     [facility] increased PSA velocity and was diagnosed with prostate cancer.  He was evaluated by Dr. De Dios but is unclear as to what direction he should take.  Based on patient's account he was recommended to pursue an active surveillance approach.  They discussed radiation treatment as well as radical prostatectomy but did not discuss any other less invasive active treatment approaches.  PSA level here is slightly lower at 4.0 (4.4 previously).  I do not have " any of the urology correspondence accessible to me at this time.    He has gained significant amount of weight due to poor lifestyle choices.  BMI currently stands at 43.86 consistent with morbid obesity.  Blood pressure is elevated here but also at home.  He denies chest pains, headaches or vision change.  He is on rosuvastatin for hyperlipidemia without significant problems.          Patient Care Team:  Luis Antonio Berumen MD as PCP - General (Internal Medicine)    REVIEW OF SYSTEMS     Review of Systems   Constitutional neg except per HPI   Resp neg  CV neg    negative for change in urination   Sinus congestion and L>R ear pressure without pain or drainage       PHYSICAL EXAMINATION     Physical Exam  Significant weight gain noted  Cardiovascular: Normal rate, regular rhythm.   No lower extremity edema   TM's full without erythema       REVIEWED DATA     Labs:     Lab Results   Component Value Date     01/19/2022    K 4.1 01/19/2022    CALCIUM 9.7 01/19/2022    AST 18 05/27/2022    ALT 25 05/27/2022    BUN 15 01/19/2022    CREATININE 1.01 01/19/2022    CREATININE 1.07 09/11/2020    CREATININE 1.05 02/12/2020    EGFRIFNONA 85 01/19/2022    EGFRIFAFRI 98 01/19/2022       Lab Results   Component Value Date    HGBA1C 5.5 01/19/2022    HGBA1C 5.60 02/12/2020    HGBA1C 5.2 02/19/2019       Lab Results   Component Value Date    LDL 80 05/27/2022     (H) 01/19/2022    LDL 68 09/11/2020    HDL 58 05/27/2022    HDL 57 01/19/2022    TRIG 128 05/27/2022    TRIG 90 01/19/2022       Lab Results   Component Value Date    TSH 0.988 01/19/2022    TSH 1.090 02/19/2019    TSH 1.17 03/03/2015    FREET4 1.34 01/19/2022    FREET4 1.27 02/19/2019    FREET4 1.20 03/03/2015       Lab Results   Component Value Date    WBC 6.9 01/19/2022    HGB 16.7 01/19/2022     01/19/2022       No results found for: MALBCRERATIO       Imaging:           Medical Tests:           Summary of old records / correspondence / consultant report:      Request correspondence from [facility]        Request outside records:             *Examiner was wearing KN95 mask and eye protection during the entire duration of the visit. Patient was masked the entire time. Minimum social distance of 6 ft maintained entire visit except if physical contact was necessary as documented.       Template created by Gilberto Berumen MD       Answers for HPI/ROS submitted by the patient on 12/8/2022  What is the primary reason for your visit?: Other  Please describe your symptoms.: Results from biopsy in July indicate prostate cancer.  Wanted to discuss with Dr. Berumen.  Urologist advised no treatment for now due to low indications.  I have provided copies of results for Dr. Berumen last week at office.  I would also like to discuss left ear pain, has been on and off for about a month.  Lastly I would like a flu shot and Covid booster if available.  Have you had these symptoms before?: Yes  How long have you been having these symptoms?: Greater than 2 weeks  Please list any medications you are currently taking for this condition.: Not taking any medications for ear discomfort.  Please describe any probable cause for these symptoms. : Water may have entered ear canal from showering.

## 2022-12-09 NOTE — ASSESSMENT & PLAN NOTE
Saw Dr. De Dios. Not convinced on direction to take (active surveillance vs treatment).   Recommended going to [facility] for further assessment / 2nd opinion.   He was in agreement with this plan.   He will self refer to [facility].     Lab Results   Component Value Date    PSA 4.0 11/28/2022    PSA 4.4 (H) 05/27/2022    PSA 3.8 01/19/2022

## 2022-12-09 NOTE — TELEPHONE ENCOUNTER
----- Message from Luis Antonio Berumen MD sent at 12/9/2022  8:01 AM EST -----  Regarding: Request correspondence from urologist  Request correspondence from [facility]

## 2022-12-09 NOTE — ASSESSMENT & PLAN NOTE
Notable weight gain due to poor lifestyle choices.  Maintain a low sugar/starch/carbohydrate diet.   Increase activity.   On follow-up consider medication for weight loss.     Wt Readings from Last 3 Encounters:   12/09/22 135 kg (297 lb)   06/03/22 127 kg (281 lb)   05/13/22 122 kg (269 lb 9.6 oz)     Body mass index is 43.86 kg/m².

## 2022-12-10 NOTE — PROGRESS NOTES
MyChart:    Here are the result(s) of your test(s):     A1c for glucose average and fasting glucose are both mildly elevated.  Maintain low carbohydrate diet.  Continue to work on weight loss as we discussed.  We could consider medication to help him lose weight on your follow-up if you are interested.    Please do not hesitate to contact me if you have questions.

## 2023-01-25 ENCOUNTER — TELEPHONE (OUTPATIENT)
Dept: INTERNAL MEDICINE | Age: 53
End: 2023-01-25

## 2023-01-25 ENCOUNTER — TELEMEDICINE (OUTPATIENT)
Dept: INTERNAL MEDICINE | Age: 53
End: 2023-01-25
Payer: COMMERCIAL

## 2023-01-25 DIAGNOSIS — U07.1 COVID-19: Primary | ICD-10-CM

## 2023-01-25 PROCEDURE — 99213 OFFICE O/P EST LOW 20 MIN: CPT

## 2023-01-25 RX ORDER — ALBUTEROL SULFATE 90 UG/1
2 AEROSOL, METERED RESPIRATORY (INHALATION) EVERY 4 HOURS PRN
Qty: 18 G | Refills: 0 | Status: SHIPPED | OUTPATIENT
Start: 2023-01-25

## 2023-01-25 NOTE — PROGRESS NOTES
I N T E R N A L  M E D I C I N E  CHUCHO ANDREWS      ENCOUNTER DATE:  01/25/2023    Christmillicentfuad TAHIR Siddiqui / 52 y.o. / male        You have chosen to receive care through a telehealth visit.  Do you consent to use a video/audio connection for your medical care today? YES    Location of patient and provider are in [place]       CHIEF COMPLAINT / REASON FOR OFFICE VISIT     TELEHEALTH ENCOUNTER:  COVID-19      ASSESSMENT & PLAN     Diagnoses and all orders for this visit:    1. COVID-19 (Primary)  -     Nirmatrelvir&Ritonavir 300/100 (PAXLOVID) 20 x 150 MG & 10 x 100MG tablet therapy pack tablet; Take 3 tablets by mouth 2 (Two) Times a Day for 5 days.  Dispense: 30 tablet; Refill: 0  -     albuterol sulfate  (90 Base) MCG/ACT inhaler; Inhale 2 puffs Every 4 (Four) Hours As Needed for Wheezing or Shortness of Air.  Dispense: 18 g; Refill: 0          SUMMARY/DISCUSSION  • Discussed risks/ benefits/ side effects of Paxlovid with pt.  He would like to proceed.  • He will HOLD statin throughout Paxlovid dosing.  • Encourage good hydration with water, continue Tylenol, Mucinex DM.  • Provide us with an update via [employer] in 1-2 days.  • Visit ER for any chest pain, dyspnea, oxygenation < 92%.      Time spent: 15 minutes    Next Appointment with me: Visit date not found    Return for Next scheduled follow up.        HISTORY OF PRESENT ILLNESS     Symptoms started on Saturday, January 21.  He tested positive for COVID-19 at home on Sunday, January 22.  Symptoms include fever, body aches, coughing, sore throat, some chest tightness with coughing.  Denies shortness of breath, chest pain.  Oxygenation saturation has remained > 95-96%.  He is drinking and urinating well.  Does report diminished appetite.    Medical history significant for HTN, HLD, obesity and prostate cancer.    He is taking tylenol and Mucinex with benefit.  Temperature at today's is 100.          REVIEWED DATA     Labs:           Imaging:            Medical Tests:           Summary of old records / correspondence / consultant report:           Request outside records:         Template created by Gilberto Berumen MD

## 2023-01-25 NOTE — TELEPHONE ENCOUNTER
Caller: Moose Siddiqui    Relationship to patient: Self    Best call back number: * [phone] (Home)    Date of exposure:     Date of positive COVID19 test: SUNDAY    Date if possible COVID19 exposure:     COVID19 symptoms: FEVER, BODY ACHES, SORE THROAT, COUGHING, TIGHTNESS IN CHEST OXYGEN GOOD     Date of initial quarantine: *Sunday     Additional information or concerns:     What is the patients preferred pharmacy: [facility] [id number] - [facility] [id number] AT [facility] & [address] - [phone] [facility] [phone]   [phone]      TAKING TYLENOL AND MUCINEX CURRENTLY

## 2023-01-27 DIAGNOSIS — E78.5 HYPERLIPIDEMIA, UNSPECIFIED HYPERLIPIDEMIA TYPE: Chronic | ICD-10-CM

## 2023-01-27 RX ORDER — ROSUVASTATIN CALCIUM 5 MG/1
5 TABLET, COATED ORAL NIGHTLY
Qty: 90 TABLET | Refills: 3 | Status: SHIPPED | OUTPATIENT
Start: 2023-01-27

## 2023-02-07 DIAGNOSIS — I10 ESSENTIAL HYPERTENSION: Chronic | ICD-10-CM

## 2023-02-08 RX ORDER — LISINOPRIL 20 MG/1
20 TABLET ORAL DAILY
Qty: 90 TABLET | Refills: 3 | Status: SHIPPED | OUTPATIENT
Start: 2023-02-08 | End: 2023-03-03 | Stop reason: SINTOL

## 2023-03-03 ENCOUNTER — OFFICE VISIT (OUTPATIENT)
Dept: INTERNAL MEDICINE | Age: 53
End: 2023-03-03
Payer: COMMERCIAL

## 2023-03-03 VITALS
BODY MASS INDEX: 41.18 KG/M2 | OXYGEN SATURATION: 96 % | SYSTOLIC BLOOD PRESSURE: 160 MMHG | TEMPERATURE: 97.2 F | DIASTOLIC BLOOD PRESSURE: 90 MMHG | HEIGHT: 69 IN | WEIGHT: 278 LBS | HEART RATE: 86 BPM

## 2023-03-03 DIAGNOSIS — I10 ESSENTIAL HYPERTENSION: Primary | Chronic | ICD-10-CM

## 2023-03-03 DIAGNOSIS — E78.00 PURE HYPERCHOLESTEROLEMIA: Chronic | ICD-10-CM

## 2023-03-03 DIAGNOSIS — F51.01 PRIMARY INSOMNIA: ICD-10-CM

## 2023-03-03 DIAGNOSIS — E66.01 MORBID OBESITY DUE TO EXCESS CALORIES: Chronic | ICD-10-CM

## 2023-03-03 DIAGNOSIS — R05.3 PERSISTENT COUGH: ICD-10-CM

## 2023-03-03 DIAGNOSIS — U07.1 COVID-19: ICD-10-CM

## 2023-03-03 PROCEDURE — 99214 OFFICE O/P EST MOD 30 MIN: CPT | Performed by: INTERNAL MEDICINE

## 2023-03-03 RX ORDER — PSEUDOEPHEDRINE HCL 120 MG
TABLET, EXTENDED RELEASE ORAL
COMMUNITY
Start: 2023-02-01

## 2023-03-03 RX ORDER — MULTIPLE VITAMINS W/ MINERALS TAB 9MG-400MCG
TAB ORAL
COMMUNITY
Start: 2022-12-13

## 2023-03-03 RX ORDER — ACETAMINOPHEN/DIPHENHYDRAMINE 500MG-25MG
TABLET ORAL
COMMUNITY
Start: 2023-01-30

## 2023-03-03 RX ORDER — GUAIFENESIN 600 MG/1
TABLET, EXTENDED RELEASE ORAL
COMMUNITY
Start: 2023-01-23

## 2023-03-03 RX ORDER — FLUTICASONE PROPIONATE 50 MCG
SPRAY, SUSPENSION (ML) NASAL
COMMUNITY
Start: 2022-12-13

## 2023-03-03 RX ORDER — TRAZODONE HYDROCHLORIDE 50 MG/1
50 TABLET ORAL NIGHTLY
Qty: 30 TABLET | Refills: 2 | Status: SHIPPED | OUTPATIENT
Start: 2023-03-03

## 2023-03-03 RX ORDER — LOSARTAN POTASSIUM 100 MG/1
100 TABLET ORAL DAILY
Qty: 30 TABLET | Refills: 3 | Status: SHIPPED | OUTPATIENT
Start: 2023-03-03

## 2023-03-03 RX ORDER — FEXOFENADINE HCL 180 MG/1
180 TABLET ORAL DAILY
COMMUNITY

## 2023-03-03 RX ORDER — TIOTROPIUM BROMIDE INHALATION SPRAY 3.12 UG/1
2 SPRAY, METERED RESPIRATORY (INHALATION)
Qty: 4 G | Refills: 0 | Status: SHIPPED | OUTPATIENT
Start: 2023-03-03

## 2023-03-03 NOTE — ASSESSMENT & PLAN NOTE
Blood pressure is consistently elevated at home.  He has had persistent nonproductive cough since COVID infection in January.  He may have a little bit of ACE inhibitor related cough therefore lisinopril will be discontinued and he will be started on losartan 100 mg daily.  Advised him to monitor his blood pressure closely.  Maintain low-sodium diet.  Encouraged ongoing weight loss efforts.  BP Readings from Last 3 Encounters:   03/03/23 160/90   12/09/22 144/90   06/03/22 120/84

## 2023-03-03 NOTE — ASSESSMENT & PLAN NOTE
Wt Readings from Last 3 Encounters:   03/03/23 126 kg (278 lb)   12/09/22 135 kg (297 lb)   06/03/22 127 kg (281 lb)     Body mass index is 41.05 kg/m².    Maintain low carbohydrate diet.  Increase regular physical activity.  Discussed GLP-1 therapy for weight loss if he is interested.  I advised him to look into Wegovy and Saxenda.

## 2023-03-03 NOTE — ASSESSMENT & PLAN NOTE
Persistent cough since January 2023 after COVID infection.  Check chest x-ray.  Trial Spiriva 2.52 puffs daily to see if this helps with his cough symptoms.  We will discontinue lisinopril and start him on losartan for hypertension.

## 2023-03-03 NOTE — PROGRESS NOTES
I N T E R N A L  M E D I C I N E    J U N O H  K I M,  M D      ENCOUNTER DATE:  03/03/2023    Moose Siddiqui / 52 y.o. / male    CHIEF COMPLAINT / REASON FOR OFFICE VISIT     Hypertension, Obesity, Hyperlipidemia, Insomnia, and Persistent cough since COVID infection in January      ASSESSMENT & PLAN     Problem List Items Addressed This Visit        High    Essential hypertension - Primary (Chronic)    Current Assessment & Plan     Blood pressure is consistently elevated at home.  He has had persistent nonproductive cough since COVID infection in January.  He may have a little bit of ACE inhibitor related cough therefore lisinopril will be discontinued and he will be started on losartan 100 mg daily.  Advised him to monitor his blood pressure closely.  Maintain low-sodium diet.  Encouraged ongoing weight loss efforts.  BP Readings from Last 3 Encounters:   03/03/23 160/90   12/09/22 144/90   06/03/22 120/84             Relevant Medications    hydroCHLOROthiazide (HYDRODIURIL) 12.5 MG tablet    losartan (COZAAR) 100 MG tablet    Primary insomnia (Chronic)    Current Assessment & Plan     Information provided for proper sleep hygiene.  Minimize caffeine as much as possible.  Avoid working or using iPad/iPhone/computer 2 to 3 hours before bedtime.  Trial of trazodone 50 mg nightly.  Strongly suggested looking into sleep study to rule out sleep apnea.         Relevant Medications    traZODone (DESYREL) 50 MG tablet       Medium    Morbid obesity due to excess calories (HCC) (Chronic)    Current Assessment & Plan       Wt Readings from Last 3 Encounters:   03/03/23 126 kg (278 lb)   12/09/22 135 kg (297 lb)   06/03/22 127 kg (281 lb)     Body mass index is 41.05 kg/m².    Maintain low carbohydrate diet.  Increase regular physical activity.  Discussed GLP-1 therapy for weight loss if he is interested.  I advised him to look into Wegovy and Saxenda.          Hyperlipidemia (Chronic)    Overview     Continue  "rosuvastatin 5 mg daily         Relevant Medications    rosuvastatin (CRESTOR) 5 MG tablet    Persistent cough    Current Assessment & Plan     Persistent cough since January 2023 after COVID infection.  Check chest x-ray.  Trial Spiriva 2.52 puffs daily to see if this helps with his cough symptoms.  We will discontinue lisinopril and start him on losartan for hypertension.         Relevant Medications    tiotropium bromide monohydrate (Spiriva Respimat) 2.5 MCG/ACT aerosol solution inhaler    Other Relevant Orders    XR Chest 2 View       Low    COVID-19    Relevant Medications    albuterol sulfate  (90 Base) MCG/ACT inhaler    tiotropium bromide monohydrate (Spiriva Respimat) 2.5 MCG/ACT aerosol solution inhaler    Other Relevant Orders    XR Chest 2 View     Orders Placed This Encounter   Procedures   • XR Chest 2 View     New Medications Ordered This Visit   Medications   • losartan (COZAAR) 100 MG tablet     Sig: Take 1 tablet by mouth Daily.     Dispense:  30 tablet     Refill:  3   • tiotropium bromide monohydrate (Spiriva Respimat) 2.5 MCG/ACT aerosol solution inhaler     Sig: Inhale 2 puffs Every Morning.     Dispense:  4 g     Refill:  0   • traZODone (DESYREL) 50 MG tablet     Sig: Take 1 tablet by mouth Every Night.     Dispense:  30 tablet     Refill:  2       SUMMARY/DISCUSSION  •       Next Appointment with me: Visit date not found    Return in about 3 months (around 6/3/2023) for Hypertension, Obesity.      VITAL SIGNS     Vitals:    03/03/23 0901   BP: 160/90   Pulse: 86   Temp: 97.2 °F (36.2 °C)   SpO2: 96%   Weight: 126 kg (278 lb)   Height: 175.3 cm (69\")       BP Readings from Last 3 Encounters:   03/03/23 160/90   12/09/22 144/90   06/03/22 120/84     Wt Readings from Last 3 Encounters:   03/03/23 126 kg (278 lb)   12/09/22 135 kg (297 lb)   06/03/22 127 kg (281 lb)     Body mass index is 41.05 kg/m².    Blood pressure readings recorded on patient flowsheet:  No flowsheet data found. "       MEDICATIONS AT THE TIME OF OFFICE VISIT     Current Outpatient Medications on File Prior to Visit   Medication Sig   • albuterol sulfate  (90 Base) MCG/ACT inhaler Inhale 2 puffs Every 4 (Four) Hours As Needed for Wheezing or Shortness of Air.   • diphenhydrAMINE-acetaminophen (Tylenol PM Extra Strength)  MG tablet per tablet    • fexofenadine (ALLEGRA) 180 MG tablet Take 1 tablet by mouth Daily.   • fluticasone (FLONASE) 50 MCG/ACT nasal spray    • guaiFENesin (MUCINEX) 600 MG 12 hr tablet    • hydroCHLOROthiazide (HYDRODIURIL) 12.5 MG tablet Take 1 tablet by mouth Daily.   • Magnesium ([facility] Triple Magnesium Complex) 400 MG capsule    • multivitamin with minerals tablet tablet    • rosuvastatin (CRESTOR) 5 MG tablet Take 1 tablet by mouth Every Night.   • [DISCONTINUED] lisinopril (PRINIVIL,ZESTRIL) 20 MG tablet Take 1 tablet by mouth Daily.     No current facility-administered medications on file prior to visit.          HISTORY OF PRESENT ILLNESS     The patient reportedly tested positive for COVID-19 on 01/21/2023 which was treated with Paxlovid.  He now has a residual cough. He has a persistent cough that reportedly worsens throughout the day to the point of retching. He denies any dyspnea or fever.    The patient notes a history of insomnia. He denies having any sleep apnea or any previous sleep studies. He admits to snoring. The patient reports waking up between 4:00 AM and 5:00 AM. He denies daytime fatigue and denies taking naps. He confirms that he does consume caffeine before 3:00 PM. He reportedly takes Tylenol PM as a sleep aid. He has tried melatonin and magnesium in the past with no improvement. The patient expresses preference to avoid needing a CPAP machine.     The patient's blood pressure is elevated today. He began taking lisinopril in 01/2022, and he has continued taking hydrochlorothiazide for multiple years. His blood pressure reading at home averages 140s mmHg systolic. The  patient reportedly lost weight since his last appointment, however, he gained 9 pounds overall since 05/2022. He reports having significant stress due to work-related financial issues, untreated prostate cancer, and family health.      Patient Care Team:  Luis Antonio Berumen MD as PCP - General (Internal Medicine)    REVIEW OF SYSTEMS     Review of Systems       PHYSICAL EXAMINATION     Physical Exam  General: No acute distress; obese   Psych: Normal thought and judgment   Cardiovascular Rate: normal. Rhythm: regular. Heart sounds: normal   Pulm/Chest: Effort normal, breath sounds normal.       REVIEWED DATA     Labs:     Lab Results   Component Value Date     12/09/2022    K 4.1 12/09/2022    CALCIUM 10.0 12/09/2022    AST 22 12/09/2022    ALT 31 12/09/2022    BUN 17 12/09/2022    CREATININE 0.95 12/09/2022    CREATININE 1.01 01/19/2022    CREATININE 1.07 09/11/2020    EGFRIFNONA 85 01/19/2022    EGFRIFAFRI 98 01/19/2022       Lab Results   Component Value Date    HGBA1C 5.70 (H) 12/09/2022    HGBA1C 5.5 01/19/2022    HGBA1C 5.60 02/12/2020       Lab Results   Component Value Date    LDL 80 05/27/2022     (H) 01/19/2022    LDL 68 09/11/2020    HDL 58 05/27/2022    HDL 57 01/19/2022    TRIG 128 05/27/2022    TRIG 90 01/19/2022       Lab Results   Component Value Date    TSH 0.988 01/19/2022    TSH 1.090 02/19/2019    TSH 1.17 03/03/2015    FREET4 1.34 01/19/2022    FREET4 1.27 02/19/2019    FREET4 1.20 03/03/2015       Lab Results   Component Value Date    WBC 6.9 01/19/2022    HGB 16.7 01/19/2022     01/19/2022       No results found for: MALBCRERATIO       Imaging:           Medical Tests:           Summary of old records / correspondence / consultant report:           Request outside records:             *Examiner was wearing KN95 mask during the entire duration of the visit. Patient was masked the entire time. Minimum social distance of 6 ft maintained entire visit except if physical contact was  necessary as documented.       Template created by Gilberto Berumen MD     Transcribed from ambient dictation for Luis Antonio Berumen MD by Mami Manriquez.  03/03/23   13:23 EST    Patient or patient representative verbalized consent to the visit recording.  I have personally performed the services described in this document as transcribed by the above individual, and it is both accurate and complete.  Luis Antonio Berumen MD  3/3/2023  16:53 EST

## 2023-05-30 DIAGNOSIS — F51.01 PRIMARY INSOMNIA: ICD-10-CM

## 2023-05-30 RX ORDER — TRAZODONE HYDROCHLORIDE 50 MG/1
TABLET ORAL
Qty: 30 TABLET | Refills: 5 | Status: SHIPPED | OUTPATIENT
Start: 2023-05-30

## 2023-06-23 PROBLEM — R73.09 ELEVATED HEMOGLOBIN A1C: Status: ACTIVE | Noted: 2023-06-23

## 2023-06-23 PROBLEM — Z80.0 FAMILY HISTORY OF COLON CANCER: Status: RESOLVED | Noted: 2022-03-04 | Resolved: 2023-06-23

## 2023-06-23 PROBLEM — U07.1 COVID-19: Status: RESOLVED | Noted: 2023-03-03 | Resolved: 2023-06-23

## 2023-06-23 PROBLEM — R73.09 ELEVATED HEMOGLOBIN A1C: Chronic | Status: ACTIVE | Noted: 2023-06-23

## 2023-06-23 PROBLEM — R05.3 PERSISTENT COUGH: Status: RESOLVED | Noted: 2023-03-03 | Resolved: 2023-06-23

## 2023-06-23 PROBLEM — E66.812 CLASS 2 SEVERE OBESITY DUE TO EXCESS CALORIES WITH SERIOUS COMORBIDITY AND BODY MASS INDEX (BMI) OF 39.0 TO 39.9 IN ADULT: Chronic | Status: ACTIVE | Noted: 2019-02-19

## 2023-09-27 ENCOUNTER — TRANSCRIBE ORDERS (OUTPATIENT)
Dept: ADMINISTRATIVE | Facility: HOSPITAL | Age: 53
End: 2023-09-27
Payer: COMMERCIAL

## 2023-09-27 DIAGNOSIS — C61 PROSTATE CANCER: Primary | ICD-10-CM

## 2023-10-10 ENCOUNTER — TELEMEDICINE (OUTPATIENT)
Dept: FAMILY MEDICINE CLINIC | Facility: TELEHEALTH | Age: 53
End: 2023-10-10
Payer: COMMERCIAL

## 2023-10-10 VITALS
WEIGHT: 248 LBS | DIASTOLIC BLOOD PRESSURE: 80 MMHG | HEIGHT: 69 IN | HEART RATE: 80 BPM | BODY MASS INDEX: 36.73 KG/M2 | SYSTOLIC BLOOD PRESSURE: 132 MMHG | TEMPERATURE: 98.6 F

## 2023-10-10 DIAGNOSIS — B97.89 VIRAL SINUSITIS: ICD-10-CM

## 2023-10-10 DIAGNOSIS — J32.9 VIRAL SINUSITIS: ICD-10-CM

## 2023-10-10 DIAGNOSIS — J22 LOWER RESPIRATORY INFECTION: Primary | ICD-10-CM

## 2023-10-10 PROBLEM — E55.9 AVITAMINOSIS D: Status: ACTIVE | Noted: 2023-10-10

## 2023-10-10 PROBLEM — R31.29 HEMATURIA, MICROSCOPIC: Status: ACTIVE | Noted: 2023-10-10

## 2023-10-10 RX ORDER — BENZONATATE 100 MG/1
CAPSULE ORAL
Qty: 30 CAPSULE | Refills: 0 | Status: SHIPPED | OUTPATIENT
Start: 2023-10-10

## 2023-10-10 RX ORDER — AZITHROMYCIN 250 MG/1
TABLET, FILM COATED ORAL
Qty: 6 TABLET | Refills: 0 | Status: SHIPPED | OUTPATIENT
Start: 2023-10-10

## 2023-10-10 NOTE — PROGRESS NOTES
You have chosen to receive care through a telehealth visit.  Do you consent to use a video/audio connection for your medical care today? Yes     HPI  Moose Siddiqui is a 52 y.o. male  presents with complaint of  congestion, cough, headache, body aches. He would like Rx for Zithromax. 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 He also reports that others have been therapeutically treated with a zpak and it was recommended to him. Additional symptoms that he  is having are noted in the ROS portion of this visit. He has had his symptoms for 2-3 days. Over the counter he has taken Mucinex and tylenol for his symptoms. He did do a COVID test this am the result was negative.    Review of Systems   Constitutional:  Negative for chills, fatigue and fever.   HENT:  Positive for congestion (clear), postnasal drip, rhinorrhea, sinus pressure (mild), sinus pain (mild) and sore throat (mild). Negative for sneezing.    Respiratory:  Positive for cough. Negative for chest tightness, shortness of breath and wheezing.         Coughing up clear/yellow   Gastrointestinal:  Negative for diarrhea and nausea.   Musculoskeletal:  Positive for myalgias.   Neurological:  Positive for headaches.       Past Medical History:   Diagnosis Date    Cancer July 2022    Prostate    COVID-19 3/3/2023       Family History   Problem Relation Age of Onset    Hypertension Mother     Other Mother         benign brain tumor    Obesity Mother     Depression Mother     Colon cancer Father     Hypertension Father     Atrial fibrillation Father     Obesity Father     Lymphoma Father     Melanoma Father     Cancer Father     Ovarian cancer Sister     Cancer Sister     Depression Sister     Hypertension Brother     Obesity Brother     Hypertension Brother     Obesity Brother     Depression Son         suicidality    Depression Daughter     Diabetes Neg Hx     Prostate cancer Neg Hx        Social  "History     Socioeconomic History    Marital status:      Spouse name: Kallie    Number of children: 2   Tobacco Use    Smoking status: Never    Smokeless tobacco: Never   Substance and Sexual Activity    Alcohol use: Not Currently    Drug use: Never    Sexual activity: Yes     Partners: Female     Birth control/protection: Post-menopausal, None     Comment: wife is post-menopausal       Moose Siddiqui  reports that he has never smoked. He has never used smokeless tobacco..       /80   Pulse 80   Temp 98.6 øF (37 øC)   Ht 175.3 cm (69\")   Wt 112 kg (248 lb)   BMI 36.62 kg/mý     PHYSICAL EXAM  Physical Exam   Constitutional: He is oriented to person, place, and time. He appears well-developed.   HENT:   Head: Normocephalic and atraumatic.   Nose: Congestion present. Right sinus exhibits maxillary sinus tenderness (patient directed exam, mild) and frontal sinus tenderness (patient directed exam, mild). Left sinus exhibits maxillary sinus tenderness (patient directed exam, mild) and frontal sinus tenderness (patient directed exam, mild).   Eyes: Lids are normal. Right eye exhibits no discharge. Left eye exhibits no discharge. Right conjunctiva is not injected. Left conjunctiva is not injected.   Pulmonary/Chest:  No respiratory distress.  Neurological: He is alert and oriented to person, place, and time. No cranial nerve deficit.   Psychiatric: He has a normal mood and affect. His speech is normal and behavior is normal. Judgment and thought content normal.       Results for orders placed or performed in visit on 06/23/23   Comprehensive Metabolic Panel    Specimen: Blood   Result Value Ref Range    Glucose 92 65 - 99 mg/dL    BUN 16 6 - 20 mg/dL    Creatinine 0.96 0.76 - 1.27 mg/dL    EGFR Result 93.9 >60.0 mL/min/1.73    BUN/Creatinine Ratio 16.7 7.0 - 25.0    Sodium 141 136 - 145 mmol/L    Potassium 3.9 3.5 - 5.2 mmol/L    Chloride 99 98 - 107 mmol/L    Total CO2 31.3 (H) 22.0 - 29.0 " mmol/L    Calcium 9.9 8.6 - 10.5 mg/dL    Total Protein 6.8 6.0 - 8.5 g/dL    Albumin 4.8 3.5 - 5.2 g/dL    Globulin 2.0 gm/dL    A/G Ratio 2.4 g/dL    Total Bilirubin 0.6 0.0 - 1.2 mg/dL    Alkaline Phosphatase 86 39 - 117 U/L    AST (SGOT) 16 1 - 40 U/L    ALT (SGPT) 22 1 - 41 U/L   Lipid Panel With / Chol / HDL Ratio    Specimen: Blood   Result Value Ref Range    Total Cholesterol 129 0 - 200 mg/dL    Triglycerides 114 0 - 150 mg/dL    HDL Cholesterol 52 40 - 60 mg/dL    VLDL Cholesterol Javier 21 5 - 40 mg/dL    LDL Chol Calc (NIH) 56 0 - 100 mg/dL    Chol/HDL Ratio 2.48    Hemoglobin A1c    Specimen: Blood   Result Value Ref Range    Hemoglobin A1C 5.50 4.80 - 5.60 %       Diagnoses and all orders for this visit:    1. Lower respiratory infection (Primary)    2. Viral sinusitis    Other orders  -     azithromycin (Zithromax) 250 MG tablet; Take 2 tablets the first day, then 1 tablet daily for 4 days.  Dispense: 6 tablet; Refill: 0  -     benzonatate (Tessalon Perles) 100 MG capsule; 1 to 2 capsules 3 times a day  Dispense: 30 capsule; Refill: 0    Probiotics for two weeks related to taking antibiotics. The pharmacist can help you with this if needed. Do not take within two hours of antibiotic.  Continue Mucinex with plenty of fluids especially water to thin secretions and help with congestion.  Tessalon perles as needed for cough  Repeat COVID test advised.    FOLLOW-UP  If symptoms worsen or persist follow up with PCP, Virtual Care or Urgent Care    Patient verbalizes understanding of medication dosage, comfort measures, instructions for treatment and follow-up.    CHUCHO Hanley  10/10/2023  08:41 EDT    The use of a video visit has been reviewed with the patient and verbal informed consent has been obtained. Myself and Moose Siddiqui participated in this visit. The patient is located in [address] [place] [address].    I am located in [place], [place]. [employer] and [employer] were  utilized. I spent 25 minutes in the patient's chart for this visit.

## 2023-10-10 NOTE — PATIENT INSTRUCTIONS
Sinus Infection, Adult  A sinus infection, also called sinusitis, is inflammation of your sinuses. Sinuses are hollow spaces in the bones around your face. Your sinuses are located:  Around your eyes.  In the middle of your forehead.  Behind your nose.  In your cheekbones.  Mucus normally drains out of your sinuses. When your nasal tissues become inflamed or swollen, mucus can become trapped or blocked. This allows bacteria, viruses, and fungi to grow, which leads to infection. Most infections of the sinuses are caused by a virus.  A sinus infection can develop quickly. It can last for up to 4 weeks (acute) or for more than 12 weeks (chronic). A sinus infection often develops after a cold.  What are the causes?  This condition is caused by anything that creates swelling in the sinuses or stops mucus from draining. This includes:  Allergies.  Asthma.  Infection from bacteria or viruses.  Deformities or blockages in your nose or sinuses.  Abnormal growths in the nose (nasal polyps).  Pollutants, such as chemicals or irritants in the air.  Infection from fungi. This is rare.  What increases the risk?  You are more likely to develop this condition if you:  Have a weak body defense system (immune system).  Do a lot of swimming or diving.  Overuse nasal sprays.  Smoke.  What are the signs or symptoms?  The main symptoms of this condition are pain and a feeling of pressure around the affected sinuses. Other symptoms include:  Stuffy nose or congestion that makes it difficult to breathe through your nose.  Thick yellow or greenish drainage from your nose.  Tenderness, swelling, and warmth over the affected sinuses.  A cough that may get worse at night.  Decreased sense of smell and taste.  Extra mucus that collects in the throat or the back of the nose (postnasal drip) causing a sore throat or bad breath.  Tiredness (fatigue).  Fever.  How is this diagnosed?  This condition is diagnosed based on:  Your symptoms.  Your  medical history.  A physical exam.  Tests to find out if your condition is acute or chronic. This may include:  Checking your nose for nasal polyps.  Viewing your sinuses using a device that has a light (endoscope).  Testing for allergies or bacteria.  Imaging tests, such as an MRI or CT scan.  In rare cases, a bone biopsy may be done to rule out more serious types of fungal sinus disease.  How is this treated?  Treatment for a sinus infection depends on the cause and whether your condition is chronic or acute.  If caused by a virus, your symptoms should go away on their own within 10 days. You may be given medicines to relieve symptoms. They include:  Medicines that shrink swollen nasal passages (decongestants).  A spray that eases inflammation of the nostrils (topical intranasal corticosteroids).  Rinses that help get rid of thick mucus in your nose (nasal saline washes).  Medicines that treat allergies (antihistamines).  Over-the-counter pain relievers.  If caused by bacteria, your health care provider may recommend waiting to see if your symptoms improve. Most bacterial infections will get better without antibiotic medicine. You may be given antibiotics if you have:  A severe infection.  A weak immune system.  If caused by narrow nasal passages or nasal polyps, surgery may be needed.  Follow these instructions at home:  Medicines  Take, use, or apply over-the-counter and prescription medicines only as told by your health care provider. These may include nasal sprays.  If you were prescribed an antibiotic medicine, take it as told by your health care provider. Do not stop taking the antibiotic even if you start to feel better.  Hydrate and humidify    Drink enough fluid to keep your urine pale yellow. Staying hydrated will help to thin your mucus.  Use a cool mist humidifier to keep the humidity level in your home above 50%.  Inhale steam for 10-15 minutes, 3-4 times a day, or as told by your health care  provider. You can do this in the bathroom while a hot shower is running.  Limit your exposure to cool or dry air.  Rest  Rest as much as possible.  Sleep with your head raised (elevated).  Make sure you get enough sleep each night.  General instructions    Apply a warm, moist washcloth to your face 3-4 times a day or as told by your health care provider. This will help with discomfort.  Use nasal saline washes as often as told by your health care provider.  Wash your hands often with soap and water to reduce your exposure to germs. If soap and water are not available, use hand .  Do not smoke. Avoid being around people who are smoking (secondhand smoke).  Keep all follow-up visits. This is important.  Contact a health care provider if:  You have a fever.  Your symptoms get worse.  Your symptoms do not improve within 10 days.  Get help right away if:  You have a severe headache.  You have persistent vomiting.  You have severe pain or swelling around your face or eyes.  You have vision problems.  You develop confusion.  Your neck is stiff.  You have trouble breathing.  These symptoms may be an emergency. Get help right away. Call 911.  Do not wait to see if the symptoms will go away.  Do not drive yourself to the hospital.  Summary  A sinus infection is soreness and inflammation of your sinuses. Sinuses are hollow spaces in the bones around your face.  This condition is caused by nasal tissues that become inflamed or swollen. The swelling traps or blocks the flow of mucus. This allows bacteria, viruses, and fungi to grow, which leads to infection.  If you were prescribed an antibiotic medicine, take it as told by your health care provider. Do not stop taking the antibiotic even if you start to feel better.  Keep all follow-up visits. This is important.  This information is not intended to replace advice given to you by your health care provider. Make sure you discuss any questions you have with your health  care provider.  Document Revised: 11/22/2022 Document Reviewed: 11/22/2022  Elsevier Patient Education c 2023 [employer]

## 2023-10-24 ENCOUNTER — HOSPITAL ENCOUNTER (OUTPATIENT)
Dept: MRI IMAGING | Facility: HOSPITAL | Age: 53
Discharge: HOME | End: 2023-10-24
Admitting: UROLOGY
Payer: COMMERCIAL

## 2023-10-24 DIAGNOSIS — C61 PROSTATE CANCER: ICD-10-CM

## 2023-10-24 LAB — CREAT BLDA-MCNC: 1 MG/DL (ref 0.6–1.3)

## 2023-10-24 PROCEDURE — 72197 MRI PELVIS W/O & W/DYE: CPT

## 2023-10-24 PROCEDURE — 0 GADOBENATE DIMEGLUMINE 529 MG/ML SOLUTION: Performed by: UROLOGY

## 2023-10-24 PROCEDURE — A9577 INJ MULTIHANCE: HCPCS | Performed by: UROLOGY

## 2023-10-24 PROCEDURE — 82565 ASSAY OF CREATININE: CPT

## 2023-10-24 RX ADMIN — GADOBENATE DIMEGLUMINE 20 ML: 529 INJECTION, SOLUTION INTRAVENOUS at 07:24

## 2023-11-16 DIAGNOSIS — F51.01 PRIMARY INSOMNIA: ICD-10-CM

## 2023-11-17 RX ORDER — TRAZODONE HYDROCHLORIDE 50 MG/1
50 TABLET ORAL NIGHTLY
Qty: 30 TABLET | Refills: 5 | Status: SHIPPED | OUTPATIENT
Start: 2023-11-17

## 2024-01-16 DIAGNOSIS — E78.00 PURE HYPERCHOLESTEROLEMIA: Chronic | ICD-10-CM

## 2024-01-16 DIAGNOSIS — R73.09 ELEVATED HEMOGLOBIN A1C: ICD-10-CM

## 2024-01-16 DIAGNOSIS — Z00.00 ENCOUNTER FOR ANNUAL HEALTH EXAMINATION: ICD-10-CM

## 2024-01-16 LAB
ALBUMIN SERPL-MCNC: 4.7 G/DL (ref 3.5–5.2)
ALBUMIN/GLOB SERPL: 2.4 G/DL
ALP SERPL-CCNC: 70 U/L (ref 39–117)
ALT SERPL-CCNC: 24 U/L (ref 1–41)
AST SERPL-CCNC: 22 U/L (ref 1–40)
BASOPHILS # BLD AUTO: 0.06 10*3/MM3 (ref 0–0.2)
BASOPHILS NFR BLD AUTO: 1 % (ref 0–1.5)
BILIRUB SERPL-MCNC: 0.4 MG/DL (ref 0–1.2)
BUN SERPL-MCNC: 15 MG/DL (ref 6–20)
BUN/CREAT SERPL: 14.4 (ref 7–25)
CALCIUM SERPL-MCNC: 9.5 MG/DL (ref 8.6–10.5)
CHLORIDE SERPL-SCNC: 101 MMOL/L (ref 98–107)
CHOLEST SERPL-MCNC: 197 MG/DL (ref 0–200)
CHOLEST/HDLC SERPL: 2.63 {RATIO}
CO2 SERPL-SCNC: 26.8 MMOL/L (ref 22–29)
CREAT SERPL-MCNC: 1.04 MG/DL (ref 0.76–1.27)
EGFRCR SERPLBLD CKD-EPI 2021: 84.8 ML/MIN/1.73
EOSINOPHIL # BLD AUTO: 0.09 10*3/MM3 (ref 0–0.4)
EOSINOPHIL NFR BLD AUTO: 1.5 % (ref 0.3–6.2)
ERYTHROCYTE [DISTWIDTH] IN BLOOD BY AUTOMATED COUNT: 13.4 % (ref 12.3–15.4)
GLOBULIN SER CALC-MCNC: 2 GM/DL
GLUCOSE SERPL-MCNC: 113 MG/DL (ref 65–99)
HBA1C MFR BLD: 5.5 % (ref 4.8–5.6)
HCT VFR BLD AUTO: 48.8 % (ref 37.5–51)
HDLC SERPL-MCNC: 75 MG/DL (ref 40–60)
HGB BLD-MCNC: 16.5 G/DL (ref 13–17.7)
IMM GRANULOCYTES # BLD AUTO: 0.02 10*3/MM3 (ref 0–0.05)
IMM GRANULOCYTES NFR BLD AUTO: 0.3 % (ref 0–0.5)
LDLC SERPL CALC-MCNC: 105 MG/DL (ref 0–100)
LYMPHOCYTES # BLD AUTO: 2.14 10*3/MM3 (ref 0.7–3.1)
LYMPHOCYTES NFR BLD AUTO: 36.4 % (ref 19.6–45.3)
MCH RBC QN AUTO: 27.2 PG (ref 26.6–33)
MCHC RBC AUTO-ENTMCNC: 33.8 G/DL (ref 31.5–35.7)
MCV RBC AUTO: 80.4 FL (ref 79–97)
MONOCYTES # BLD AUTO: 0.43 10*3/MM3 (ref 0.1–0.9)
MONOCYTES NFR BLD AUTO: 7.3 % (ref 5–12)
NEUTROPHILS # BLD AUTO: 3.14 10*3/MM3 (ref 1.7–7)
NEUTROPHILS NFR BLD AUTO: 53.5 % (ref 42.7–76)
NRBC BLD AUTO-RTO: 0 /100 WBC (ref 0–0.2)
PLATELET # BLD AUTO: 273 10*3/MM3 (ref 140–450)
POTASSIUM SERPL-SCNC: 4.1 MMOL/L (ref 3.5–5.2)
PROT SERPL-MCNC: 6.7 G/DL (ref 6–8.5)
RBC # BLD AUTO: 6.07 10*6/MM3 (ref 4.14–5.8)
SODIUM SERPL-SCNC: 141 MMOL/L (ref 136–145)
T4 FREE SERPL-MCNC: 1.2 NG/DL (ref 0.93–1.7)
TRIGL SERPL-MCNC: 93 MG/DL (ref 0–150)
TSH SERPL DL<=0.005 MIU/L-ACNC: 0.99 UIU/ML (ref 0.27–4.2)
UNABLE TO VOID: NORMAL
VLDLC SERPL CALC-MCNC: 17 MG/DL (ref 5–40)
WBC # BLD AUTO: 5.88 10*3/MM3 (ref 3.4–10.8)

## 2024-01-24 ENCOUNTER — OFFICE VISIT (OUTPATIENT)
Dept: INTERNAL MEDICINE | Age: 54
End: 2024-01-24
Payer: COMMERCIAL

## 2024-01-24 VITALS
HEIGHT: 69 IN | OXYGEN SATURATION: 98 % | DIASTOLIC BLOOD PRESSURE: 100 MMHG | SYSTOLIC BLOOD PRESSURE: 146 MMHG | TEMPERATURE: 97.3 F | BODY MASS INDEX: 41.47 KG/M2 | WEIGHT: 280 LBS | HEART RATE: 74 BPM

## 2024-01-24 DIAGNOSIS — Z00.00 ENCOUNTER FOR ANNUAL HEALTH EXAMINATION: Primary | ICD-10-CM

## 2024-01-24 DIAGNOSIS — E78.00 PURE HYPERCHOLESTEROLEMIA: Chronic | ICD-10-CM

## 2024-01-24 DIAGNOSIS — E66.01 MORBID OBESITY WITH BMI OF 40.0-44.9, ADULT: Chronic | ICD-10-CM

## 2024-01-24 DIAGNOSIS — R73.09 ELEVATED HEMOGLOBIN A1C: Chronic | ICD-10-CM

## 2024-01-24 DIAGNOSIS — I10 ESSENTIAL HYPERTENSION: Chronic | ICD-10-CM

## 2024-01-24 RX ORDER — AMLODIPINE BESYLATE 5 MG/1
5 TABLET ORAL EVERY EVENING
Qty: 30 TABLET | Refills: 3 | Status: SHIPPED | OUTPATIENT
Start: 2024-01-24

## 2024-01-24 NOTE — ASSESSMENT & PLAN NOTE
Wt Readings from Last 3 Encounters:   01/24/24 127 kg (280 lb)   10/10/23 112 kg (248 lb)   06/23/23 121 kg (267 lb)     Body mass index is 41.35 kg/m².     Gained back most of the weight he lost previously.   Has not been attentive to diet and physical activity.   We discussed GLP-1 agonists in detail today.   I encouraged him to consider medication for weight loss.   Discussed dietary modifications and exercise.

## 2024-01-24 NOTE — ASSESSMENT & PLAN NOTE
Blood pressure is higher with weight gain.   Add amlodipine 5 mg daily in the evening.   Continue losartan 100 mg and HCTZ 12.5 mg daily.   Maintain low sodium diet of less than 3 grams daily.   Increase physical activity/exercise.   Weight loss strongly advised.   Follow-up 3 months     BP Readings from Last 3 Encounters:   01/24/24 146/100   10/10/23 132/80   06/23/23 136/74

## 2024-01-24 NOTE — PROGRESS NOTES
"    I N T E R N A L  M E D I C I N E    J U N O H  K I M,  M D      ENCOUNTER DATE:  01/24/2024    Moose Siddiqui / 53 y.o. / male    CHIEF COMPLAINT     Annual Exam (1/26/22), Hypertension, Prediabetes, and Obesity      VITALS     Vitals:    01/24/24 0730   BP: 146/100   Pulse: 74   Temp: 97.3 °F (36.3 °C)   SpO2: 98%   Weight: 127 kg (280 lb)   Height: 175.3 cm (69\")       BP Readings from Last 3 Encounters:   01/24/24 146/100   10/10/23 132/80   06/23/23 136/74     Wt Readings from Last 3 Encounters:   01/24/24 127 kg (280 lb)   10/10/23 112 kg (248 lb)   06/23/23 121 kg (267 lb)      Body mass index is 41.35 kg/m².    Blood pressure readings recorded on patient flowsheet:       No data to display                MEDICATIONS     Current Outpatient Medications on File Prior to Visit   Medication Sig Dispense Refill    fexofenadine (ALLEGRA) 180 MG tablet Take 1 tablet by mouth Daily.      hydroCHLOROthiazide (HYDRODIURIL) 12.5 MG tablet TAKE 1 TABLET DAILY 90 tablet 3    losartan (COZAAR) 100 MG tablet TAKE ONE TABLET BY MOUTH DAILY 30 tablet 11    rosuvastatin (CRESTOR) 5 MG tablet Take 1 tablet by mouth Every Night. 90 tablet 3    traZODone (DESYREL) 50 MG tablet Take 1 tablet by mouth Every Night. 30 tablet 5       HISTORY OF PRESENT ILLNESS      Moose presents for annual health maintenance visit.    Moose has a history of hypertension and is currently taking losartan 100 mg and hydrochlorothiazide 12.5 mg once daily in the morning. His blood pressure is elevated today at 146/100 mmHg with noted weight gain. He monitors his blood pressure at home and notes his diastolic pressure is usually around 88 to 90 mmHg and systolic pressure is usually around 130 to 145 mmHg. He admits he has not been mindful of his diet or sodium intake lately. Denies any lower extremity edema.    He has gained approximately 32 pounds since 10/2023. His BMI is 41.35 kg/m2. He has not been physically active. He denies " "any anxiety or depression but does mention increased stress at work.  He works a lot and does not get home until 9:30 to 10:00 p.m. which is when he has his dinner. His hemoglobin A1c was 5.7 percent 1 year ago and more recently is 5.5 percent. He denies any family history of diabetes.    His LDL increased to 105 mg/dL from 56 mg/dL. Currently taking rosuvastatin 5 mg daily for hyperlipidemia.     Patient has a history of insomnia and is currently taking trazodone nightly.  He wakes up feeling well rested. He admits to snoring at night but has not had a sleep study to evaluate for sleep apnea. His recent labs revealed his red blood count is elevated. Denies any alcohol use.     Patient was placed on Bactrim for 3 weeks for prostatitis in 2023. He developed a severe cough after completing the course of Bactrim and \"threw his back out.\" His symptoms have since resolved. He sees Dr. Michael every 3 months for PSA monitoring. He notes his most recent PSA was 4.1 ng/mL. He is not sexually active.     He denies any major updates in his family history. His father has a history of melanoma. He does not see a dermatologist regularly. Patient has regular dental and eye exams. Denies any digestive issues, heartburn, or reflux.      General health: multiple medical problems  Lifestyle:  Attempting to lose weight?: Yes   Diet: has not been eating as healthy, has poor dietary habits, eats heavier dinner, and eats too late  Exercise: generally sedentary  Tobacco: Never used   Alcohol: does not drink  Work: Full-time  Reproductive health:  Sexually active?: No   Concern for STD?: No   Sexual problems?: No problems   Sees Urologist?: Yes for active surveillance of prostate cancer  Depression Screenin/24/2024     7:32 AM   PHQ-2/PHQ-9 Depression Screening   Little Interest or Pleasure in Doing Things 0-->not at all   Feeling Down, Depressed or Hopeless 0-->not at all   PHQ-9: Brief Depression Severity Measure Score 0    "      PHQ-2: 0 (Not depressed)     PHQ-9: 0 (Negative screening for depression)    Patient Care Team:  Luis Antonio Berumen MD as PCP - General (Internal Medicine)  Moose Michael MD as Consulting Physician (Urology)  ______________________________________________________________________    ALLERGIES  No Known Allergies     PFSH:     The following portions of the patient's history were reviewed and updated as appropriate: Allergies / Current Medications / Past Medical History / Surgical History / Social History / Family History    PROBLEM LIST   Patient Active Problem List   Diagnosis    Essential hypertension    Morbid obesity with BMI of 40.0-44.9, adult    Hyperlipidemia    Prostate cancer    Primary insomnia    Elevated hemoglobin A1c    Avitaminosis D    Hematuria, microscopic       PAST MEDICAL HISTORY  Past Medical History:   Diagnosis Date    Cancer July 2022    Prostate    COVID-19 3/3/2023       SURGICAL HISTORY  Past Surgical History:   Procedure Laterality Date    APPENDECTOMY      COLONOSCOPY  2013    COLONOSCOPY N/A 5/13/2022    Procedure: COLONOSCOPY to cecum with cold bx polypectomies;  Surgeon: Juan Francisco Mccallum Jr., MD;  Location: [facility];  Service: General;  Laterality: N/A;  PRE: Hx of colon polyps, Fam Hx of colon cancer  POST: Diverticulosis, Polyps       SOCIAL HISTORY  Social History     Socioeconomic History    Marital status:      Spouse name: Kallie    Number of children: 2   Tobacco Use    Smoking status: Never    Smokeless tobacco: Never   Vaping Use    Vaping Use: Never used   Substance and Sexual Activity    Alcohol use: Not Currently    Drug use: Never    Sexual activity: Yes     Partners: Female     Comment: wife is post-menopausal       FAMILY HISTORY  Family History   Problem Relation Age of Onset    Hypertension Mother     Other Mother         benign brain tumor    Obesity Mother     Depression Mother     Colon cancer Father     Hypertension Father     Atrial  fibrillation Father     Obesity Father     Lymphoma Father     Melanoma Father     Ovarian cancer Sister     Depression Sister     Hypertension Brother     Obesity Brother     Hypertension Brother     Obesity Brother     Depression Daughter     Depression Son         suicidality    Diabetes Neg Hx     Prostate cancer Neg Hx        IMMUNIZATION HISTORY  Immunization History   Administered Date(s) Administered    COVID-19 (PFIZER) Purple Cap Monovalent 01/07/2021, 01/28/2021, 12/10/2021    COVID-19 F23 (PFIZER) 12YRS+ (COMIRNATY) 10/27/2023    Covid-19 (Pfizer) Gray Cap Monovalent 06/24/2022    Flu Vaccine Quad PF >36MO 11/15/2019    Flublok 18+yrs 10/27/2023    Fluzone (or Fluarix & Flulaval for VFC) >6mos 11/15/2019, 09/11/2020, 12/09/2022    Hepatitis A 10/29/2018, 05/08/2019    Influenza, Unspecified 12/08/2017, 10/29/2018, 11/19/2018, 12/17/2021, 12/09/2022    Shingrix 11/15/2019    Tdap 01/26/2022    flucelvax quad pfs =>4 YRS 12/08/2017, 10/29/2018         REVIEW OF SYSTEMS     Review of Systems   Constitutional:  Positive for unexpected weight change (significant wt gain).   HENT: Negative.     Eyes: Negative.    Respiratory: Negative.          Snores    Cardiovascular: Negative.    Gastrointestinal: Negative.    Endocrine: Negative.    Genitourinary: Negative.  Negative for hematuria.        Prostate cancer    Musculoskeletal:  Positive for back pain.   Skin: Negative.    Allergic/Immunologic: Negative.    Neurological: Negative.    Hematological: Negative.    Psychiatric/Behavioral: Negative.          High work stress         PHYSICAL EXAMINATION     Physical Exam  Constitutional:       General: He is not in acute distress.     Appearance: He is well-developed. He is obese.   HENT:      Head: Normocephalic and atraumatic.      Right Ear: Tympanic membrane, ear canal and external ear normal.      Left Ear: Tympanic membrane, ear canal and external ear normal.      Mouth/Throat:      Comments: Mellampati  Airway Class 3   Eyes:      General: No scleral icterus.     Conjunctiva/sclera: Conjunctivae normal.      Pupils: Pupils are equal, round, and reactive to light.   Neck:      Thyroid: No thyroid mass or thyromegaly.      Vascular: No carotid bruit.      Trachea: No tracheal deviation.   Cardiovascular:      Rate and Rhythm: Normal rate and regular rhythm.      Pulses: Normal pulses.      Heart sounds: Normal heart sounds.      Comments: No carotid bruit  Pulmonary:      Effort: Pulmonary effort is normal.      Breath sounds: Normal breath sounds.   Abdominal:      General: Abdomen is protuberant. There is no distension.      Palpations: Abdomen is soft. There is no mass.      Tenderness: There is no abdominal tenderness.      Hernia: No hernia is present.   Musculoskeletal:      Cervical back: Neck supple.      Right lower leg: No edema.      Left lower leg: No edema.   Lymphadenopathy:      Cervical: No cervical adenopathy.      Upper Body:      Right upper body: No axillary adenopathy.      Left upper body: No axillary adenopathy.   Skin:     General: Skin is warm.      Coloration: Skin is not jaundiced or pale.      Findings: No lesion (Negative for suspicious skin lesions/growths) or rash.      Comments: No jaundice  No suspicious skin lesions.    Neurological:      Mental Status: He is alert and oriented to person, place, and time.      Cranial Nerves: No cranial nerve deficit.      Motor: No abnormal muscle tone.   Psychiatric:         Mood and Affect: Mood normal.         Behavior: Behavior normal.         Thought Content: Thought content normal.         Judgment: Judgment normal.         REVIEWED DATA      Labs:    Lab Results   Component Value Date     01/16/2024    K 4.1 01/16/2024    CALCIUM 9.5 01/16/2024    AST 22 01/16/2024    ALT 24 01/16/2024    BUN 15 01/16/2024    CREATININE 1.04 01/16/2024    CREATININE 1.00 10/24/2023    CREATININE 0.96 06/23/2023    EGFRIFNONA 85 01/19/2022    EGFRIFAFRI  "98 01/19/2022       Lab Results   Component Value Date    GLUCOSE 113 (H) 01/16/2024    HGBA1C 5.50 01/16/2024    HGBA1C 5.50 06/23/2023    HGBA1C 5.70 (H) 12/09/2022    TSH 0.988 01/16/2024    FREET4 1.20 01/16/2024       Lab Results   Component Value Date    PSA 4.0 11/28/2022    PSA 4.4 (H) 05/27/2022    PSA 3.8 01/19/2022       Lab Results   Component Value Date    TESTOSTEROTT 392 03/03/2015    TESTFRE 11.2 03/03/2015       Lab Results   Component Value Date     (H) 01/16/2024    HDL 75 (H) 01/16/2024    TRIG 93 01/16/2024    CHOLHDLRATIO 2.63 01/16/2024       No components found for: \"GNGA240V\"    Lab Results   Component Value Date    WBC 5.88 01/16/2024    HGB 16.5 01/16/2024    MCV 80.4 01/16/2024     01/16/2024       Lab Results   Component Value Date    PROTEIN Negative 01/19/2022    GLUCOSEU Negative 01/19/2022    BLOODU Negative 01/19/2022    NITRITEU Negative 01/19/2022    LEUKOCYTESUR Negative 01/19/2022          Lab Results   Component Value Date    HEPCVIRUSABY <0.1 01/19/2022       Imaging:             Medical Tests:             Summary of old records / correspondence / consultant report:             Request outside records:         ASSESSMENT & PLAN     ANNUAL WELLNESS EXAM / PHYSICAL     Other medical problems addressed today:  Problem List Items Addressed This Visit          High    Essential hypertension (Chronic)    Current Assessment & Plan     Blood pressure is higher with weight gain.   Add amlodipine 5 mg daily in the evening.   Continue losartan 100 mg and HCTZ 12.5 mg daily.   Maintain low sodium diet of less than 3 grams daily.   Increase physical activity/exercise.   Weight loss strongly advised.   Follow-up 3 months     BP Readings from Last 3 Encounters:   01/24/24 146/100   10/10/23 132/80   06/23/23 136/74             Relevant Medications    losartan (COZAAR) 100 MG tablet    hydroCHLOROthiazide (HYDRODIURIL) 12.5 MG tablet    amLODIPine (NORVASC) 5 MG tablet       " Medium    Morbid obesity with BMI of 40.0-44.9, adult (Chronic)    Current Assessment & Plan     Wt Readings from Last 3 Encounters:   01/24/24 127 kg (280 lb)   10/10/23 112 kg (248 lb)   06/23/23 121 kg (267 lb)   Body mass index is 41.35 kg/m².     Gained back most of the weight he lost previously.   Has not been attentive to diet and physical activity.   We discussed GLP-1 agonists in detail today.   I encouraged him to consider medication for weight loss.   Discussed dietary modifications and exercise.          Hyperlipidemia (Chronic)    Overview     Continue rosuvastatin 5 mg daily         Relevant Medications    rosuvastatin (CRESTOR) 5 MG tablet       Low    Elevated hemoglobin A1c (Chronic)    Current Assessment & Plan     Lab Results   Component Value Date    GLUCOSE 113 (H) 01/16/2024    GLUCOSE 92 06/23/2023    GLUCOSE 110 (H) 12/09/2022     Lab Results   Component Value Date    HGBA1C 5.50 01/16/2024    HGBA1C 5.50 06/23/2023    HGBA1C 5.70 (H) 12/09/2022      A1c remains stable in spite of significant weight gain.   Maintain a low sugar/starch/carbohydrate diet.   Weight loss discussed.   Consider GLP-1 agonist.           Other Visit Diagnoses       Encounter for annual health examination    -  Primary            Summary/Discussion:         Next Appointment with me: Visit date not found    Return in about 3 months (around 4/24/2024) for Reassess chronic medical problems.      HEALTHCARE MAINTENANCE ISSUES       Cancer Screening:  Colon: Initial/Next screening at age: CURRENT and -Colonoscopy  Repeat colon cancer screening: every 5 years  Prostate: Has h/o prostate cancer and sees a urologist annually.   Testicular: Recommended monthly self exam  Skin: Monthly self skin examination, annual exam by health professional  Lung: Does not meet criteria for lung cancer screening.   Other:    Screening Labs & Tests:  Lab results reviewed & discussed with with patient or orders placed today.  EKG:  CV  Screening: Lipid panel  DEXA (75+ or risk factors):   HEP C (If born [id number] or risk factors): Previously had negative screen  Other:     Immunization/Vaccinations (to be given today unless deferred by patient)  Influenza: Patient had the flu shot this season  Hepatitis A: Up to date  Hepatitis B: Recommended here or at pharmacy  Tetanus/Pertussis: Up to date  Pneumococcal: Not needed at this time  Shingles: Had significant side effects from 1st shot (defers 2nd)  COVID: Already had the latest booster   RSV: Not indicated  Lifestyle Counseling:  Lifestyle Modifications: Attempt to lose weight, Improve dietary compliance, Begin progressive aerobic exercise program 3-5 days a week, Maintain a low sugar/carbohydrate diet, Follow a low fat, low cholesterol diet, Maintain low sodium diet (< 3 gm) for blood pressure, Make dinner the lightest meal of day, Reduce exposure to stress if possible, Discussed better management of stress/anxiety, and Discussed sexual issues, safe sex practices, contraception  Safety Issues: Always wear seatbelt, Avoid texting while driving   Use sunscreen, regular skin examination  Recommended annual dental/vision examination.  Emotional/Stress/Sleep: Reviewed and  given when appropriate      Health Maintenance   Topic Date Due    ANNUAL PHYSICAL  01/26/2023    LIPID PANEL  01/16/2025    BMI FOLLOWUP  01/24/2025    COLORECTAL CANCER SCREENING  05/13/2027    TDAP/TD VACCINES (2 - Td or Tdap) 01/26/2032    HEPATITIS C SCREENING  Completed    COVID-19 Vaccine  Completed    INFLUENZA VACCINE  Completed    Pneumococcal Vaccine 0-64  Aged Out    PROSTATE CANCER SCREENING  Discontinued    ZOSTER VACCINE  Discontinued     Transcribed from ambient dictation for Luis Antonio Berumen MD by Tresa Michael.  01/24/24   09:39 EST    Patient or patient representative verbalized consent to the visit recording.  I have personally performed the services described in this document as transcribed by the above  individual, and it is both accurate and complete.  Luis Antonio Berumen MD  1/24/2024  10:49 EST

## 2024-01-24 NOTE — ASSESSMENT & PLAN NOTE
Lab Results   Component Value Date    GLUCOSE 113 (H) 01/16/2024    GLUCOSE 92 06/23/2023    GLUCOSE 110 (H) 12/09/2022     Lab Results   Component Value Date    HGBA1C 5.50 01/16/2024    HGBA1C 5.50 06/23/2023    HGBA1C 5.70 (H) 12/09/2022      A1c remains stable in spite of significant weight gain.   Maintain a low sugar/starch/carbohydrate diet.   Weight loss discussed.   Consider GLP-1 agonist.

## 2024-02-09 DIAGNOSIS — E78.5 HYPERLIPIDEMIA, UNSPECIFIED HYPERLIPIDEMIA TYPE: Chronic | ICD-10-CM

## 2024-02-09 RX ORDER — ROSUVASTATIN CALCIUM 5 MG/1
5 TABLET, COATED ORAL NIGHTLY
Qty: 90 TABLET | Refills: 1 | Status: SHIPPED | OUTPATIENT
Start: 2024-02-09

## 2024-02-13 DIAGNOSIS — I10 ESSENTIAL HYPERTENSION: Chronic | ICD-10-CM

## 2024-02-13 RX ORDER — AMLODIPINE BESYLATE 5 MG/1
5 TABLET ORAL EVERY EVENING
Qty: 30 TABLET | Refills: 3 | Status: SHIPPED | OUTPATIENT
Start: 2024-02-13

## 2024-03-12 DIAGNOSIS — F51.01 PRIMARY INSOMNIA: ICD-10-CM

## 2024-03-13 RX ORDER — TRAZODONE HYDROCHLORIDE 50 MG/1
50 TABLET ORAL NIGHTLY
Qty: 30 TABLET | Refills: 5 | Status: SHIPPED | OUTPATIENT
Start: 2024-03-13

## 2024-05-04 DIAGNOSIS — I10 ESSENTIAL HYPERTENSION: Chronic | ICD-10-CM

## 2024-05-06 RX ORDER — LOSARTAN POTASSIUM 100 MG/1
100 TABLET ORAL DAILY
Qty: 90 TABLET | Refills: 1 | Status: SHIPPED | OUTPATIENT
Start: 2024-05-06

## 2024-05-31 ENCOUNTER — OFFICE VISIT (OUTPATIENT)
Dept: INTERNAL MEDICINE | Age: 54
End: 2024-05-31
Payer: COMMERCIAL

## 2024-05-31 VITALS
TEMPERATURE: 97.3 F | HEART RATE: 80 BPM | WEIGHT: 295 LBS | HEIGHT: 69 IN | DIASTOLIC BLOOD PRESSURE: 82 MMHG | OXYGEN SATURATION: 96 % | BODY MASS INDEX: 43.69 KG/M2 | SYSTOLIC BLOOD PRESSURE: 150 MMHG

## 2024-05-31 DIAGNOSIS — E78.00 PURE HYPERCHOLESTEROLEMIA: Chronic | ICD-10-CM

## 2024-05-31 DIAGNOSIS — R73.09 ELEVATED HEMOGLOBIN A1C: Chronic | ICD-10-CM

## 2024-05-31 DIAGNOSIS — I10 ESSENTIAL HYPERTENSION: Chronic | ICD-10-CM

## 2024-05-31 DIAGNOSIS — E66.01 MORBID OBESITY WITH BMI OF 40.0-44.9, ADULT: Primary | Chronic | ICD-10-CM

## 2024-05-31 PROCEDURE — 99214 OFFICE O/P EST MOD 30 MIN: CPT | Performed by: INTERNAL MEDICINE

## 2024-05-31 NOTE — PROGRESS NOTES
I N T E R N A L  M E D I C I N E    J U N O H  K I M,  M D      ENCOUNTER DATE:  05/31/2024    Moose Siddiqui / 53 y.o. / male    CHIEF COMPLAINT / REASON FOR OFFICE VISIT     Hypertension and Obesity      ASSESSMENT & PLAN     Problem List Items Addressed This Visit          High    Morbid obesity with BMI of 40.0-44.9, adult - Primary (Chronic)    Current Assessment & Plan     Wt Readings from Last 3 Encounters:   05/31/24 134 kg (295 lb)   01/24/24 127 kg (280 lb)   10/10/23 112 kg (248 lb)     Body mass index is 43.56 kg/m².    Ongoing rapid weight gain.  He has gained close to 37 pounds in 6 months.  He has gained 15 pounds since 3 months ago.    Discussed GLP-1 agonist for weight loss, in particular Zepbound.  He is interested in proceeding with this.    Start Zepbound 2.5 mg weekly X 4 weeks with plans to titrate monthly as tolerated.   Update me on weight status / side effects in 4 weeks through MyChart.     Advised to watch for nausea/vomiting, abdominal pain, constipation, and diarrhea.           Relevant Medications    Tirzepatide-Weight Management (ZEPBOUND) 2.5 MG/0.5ML solution auto-injector       Medium    Essential hypertension (Chronic)    Current Assessment & Plan     BP Readings from Last 3 Encounters:   05/31/24 150/82   01/24/24 146/100   10/10/23 132/80      Blood pressure is reported to be < 140/90 at home.   Continue same medications for now.   Monitor home blood pressure readings.           Relevant Medications    hydroCHLOROthiazide (HYDRODIURIL) 12.5 MG tablet    amLODIPine (NORVASC) 5 MG tablet    losartan (COZAAR) 100 MG tablet    Hyperlipidemia (Chronic)    Overview     Continue rosuvastatin 5 mg daily         Relevant Medications    rosuvastatin (CRESTOR) 5 MG tablet    Elevated hemoglobin A1c (Chronic)    Current Assessment & Plan     Lab Results   Component Value Date    GLUCOSE 113 (H) 01/16/2024    GLUCOSE 92 06/23/2023    GLUCOSE 110 (H) 12/09/2022     Lab Results  "  Component Value Date    HGBA1C 5.50 01/16/2024    HGBA1C 5.50 06/23/2023    HGBA1C 5.70 (H) 12/09/2022      Maintain a low sugar/starch/carbohydrate diet.  Starting Zepbound for weight loss.          No orders of the defined types were placed in this encounter.    New Medications Ordered This Visit   Medications    Tirzepatide-Weight Management (ZEPBOUND) 2.5 MG/0.5ML solution auto-injector     Sig: Inject 0.5 mL under the skin into the appropriate area as directed 1 (One) Time Per Week.     Dispense:  2 mL     Refill:  0       SUMMARY/DISCUSSION      Next Appointment with me: Visit date not found    Return in about 3 months (around 8/31/2024) for Reassess chronic medical problems.      VITAL SIGNS     Vitals:    05/31/24 1038   BP: 150/82   Pulse: 80   Temp: 97.3 °F (36.3 °C)   SpO2: 96%   Weight: 134 kg (295 lb)   Height: 175.3 cm (69\")       BP Readings from Last 3 Encounters:   05/31/24 150/82   01/24/24 146/100   10/10/23 132/80     Wt Readings from Last 3 Encounters:   05/31/24 134 kg (295 lb)   01/24/24 127 kg (280 lb)   10/10/23 112 kg (248 lb)     Body mass index is 43.56 kg/m².    Blood pressure readings recorded on patient flowsheet:       No data to display                  MEDICATIONS AT THE TIME OF OFFICE VISIT     Current Outpatient Medications on File Prior to Visit   Medication Sig    amLODIPine (NORVASC) 5 MG tablet Take 1 tablet by mouth Every Evening.    fexofenadine (ALLEGRA) 180 MG tablet Take 1 tablet by mouth Daily.    hydroCHLOROthiazide (HYDRODIURIL) 12.5 MG tablet TAKE 1 TABLET DAILY    losartan (COZAAR) 100 MG tablet Take 1 tablet by mouth daily.    rosuvastatin (CRESTOR) 5 MG tablet Take 1 tablet by mouth Every Night.    traZODone (DESYREL) 50 MG tablet Take 1 tablet by mouth once nightly.     No current facility-administered medications on file prior to visit.          HISTORY OF PRESENT ILLNESS     Previously added amlodipine 5 mg for hypertension he reports that his blood pressure " "at home is generally less than 140/90.  Denies significant side effects.  He has ongoing rapid weight gain of close to 40 pounds over 6 months.  He reports to high degree of stress which affects his eating behavior.  At this point he is open to starting a medication to help manage his weight problem.  He has history of hypertension, hyperlipidemia and elevated A1c he has comorbidities.  His current weight is 295 pounds with BMI of 43.56.    Patient Care Team:  Luis Antonio Berumen MD as PCP - General (Internal Medicine)  Moose Michael MD as Consulting Physician (Urology)    REVIEW OF SYSTEMS     Review of Systems       PHYSICAL EXAMINATION     Physical Exam  Alert with normal thought and judgment.   Morbid obesity   Abdominal protuberance      REVIEWED DATA     Labs:     Lab Results   Component Value Date     01/16/2024    K 4.1 01/16/2024    CALCIUM 9.5 01/16/2024    AST 22 01/16/2024    ALT 24 01/16/2024    BUN 15 01/16/2024    CREATININE 1.04 01/16/2024    CREATININE 1.00 10/24/2023    CREATININE 0.96 06/23/2023    EGFRRESULT 84.8 01/16/2024       Lab Results   Component Value Date    HGBA1C 5.50 01/16/2024    HGBA1C 5.50 06/23/2023    HGBA1C 5.70 (H) 12/09/2022       Lab Results   Component Value Date     (H) 01/16/2024    LDL 56 06/23/2023    LDL 80 05/27/2022    HDL 75 (H) 01/16/2024    HDL 52 06/23/2023    TRIG 93 01/16/2024    TRIG 114 06/23/2023       Lab Results   Component Value Date    TSH 0.988 01/16/2024    TSH 0.988 01/19/2022    TSH 1.090 02/19/2019    FREET4 1.20 01/16/2024    FREET4 1.34 01/19/2022    FREET4 1.27 02/19/2019       Lab Results   Component Value Date    WBC 5.88 01/16/2024    HGB 16.5 01/16/2024     01/16/2024       No results found for: \"MALBCRERATIO\"          Imaging:               Medical Tests:               Summary of old records / correspondence / consultant report:             Request outside records:       "

## 2024-05-31 NOTE — ASSESSMENT & PLAN NOTE
BP Readings from Last 3 Encounters:   05/31/24 150/82   01/24/24 146/100   10/10/23 132/80      Blood pressure is reported to be < 140/90 at home.   Continue same medications for now.   Monitor home blood pressure readings.

## 2024-05-31 NOTE — ASSESSMENT & PLAN NOTE
Wt Readings from Last 3 Encounters:   05/31/24 134 kg (295 lb)   01/24/24 127 kg (280 lb)   10/10/23 112 kg (248 lb)     Body mass index is 43.56 kg/m².    Ongoing rapid weight gain.  He has gained close to 37 pounds in 6 months.  He has gained 15 pounds since 3 months ago.    Discussed GLP-1 agonist for weight loss, in particular Zepbound.  He is interested in proceeding with this.    Start Zepbound 2.5 mg weekly X 4 weeks with plans to titrate monthly as tolerated.   Update me on weight status / side effects in 4 weeks through MyChart.     Advised to watch for nausea/vomiting, abdominal pain, constipation, and diarrhea.

## 2024-05-31 NOTE — ASSESSMENT & PLAN NOTE
Lab Results   Component Value Date    GLUCOSE 113 (H) 01/16/2024    GLUCOSE 92 06/23/2023    GLUCOSE 110 (H) 12/09/2022     Lab Results   Component Value Date    HGBA1C 5.50 01/16/2024    HGBA1C 5.50 06/23/2023    HGBA1C 5.70 (H) 12/09/2022      Maintain a low sugar/starch/carbohydrate diet.  Starting Zepbound for weight loss.

## 2024-06-02 DIAGNOSIS — I10 ESSENTIAL HYPERTENSION: Chronic | ICD-10-CM

## 2024-06-03 RX ORDER — HYDROCHLOROTHIAZIDE 12.5 MG/1
12.5 TABLET ORAL DAILY
Qty: 90 TABLET | Refills: 1 | Status: SHIPPED | OUTPATIENT
Start: 2024-06-03

## 2024-09-20 DIAGNOSIS — F51.01 PRIMARY INSOMNIA: ICD-10-CM

## 2024-09-23 RX ORDER — TRAZODONE HYDROCHLORIDE 50 MG/1
50 TABLET, FILM COATED ORAL NIGHTLY
Qty: 30 TABLET | Refills: 5 | Status: SHIPPED | OUTPATIENT
Start: 2024-09-23

## 2024-09-27 ENCOUNTER — FLU SHOT (OUTPATIENT)
Dept: INTERNAL MEDICINE | Age: 54
End: 2024-09-27
Payer: COMMERCIAL

## 2024-09-27 DIAGNOSIS — Z23 NEEDS FLU SHOT: Primary | ICD-10-CM

## 2024-09-27 PROCEDURE — 90656 IIV3 VACC NO PRSV 0.5 ML IM: CPT | Performed by: INTERNAL MEDICINE

## 2024-09-27 PROCEDURE — 90471 IMMUNIZATION ADMIN: CPT | Performed by: INTERNAL MEDICINE

## 2024-10-22 DIAGNOSIS — I10 ESSENTIAL HYPERTENSION: Chronic | ICD-10-CM

## 2024-10-23 RX ORDER — AMLODIPINE BESYLATE 5 MG/1
5 TABLET ORAL EVERY EVENING
Qty: 30 TABLET | Refills: 5 | Status: SHIPPED | OUTPATIENT
Start: 2024-10-23

## 2024-11-05 DIAGNOSIS — E78.5 HYPERLIPIDEMIA, UNSPECIFIED HYPERLIPIDEMIA TYPE: Chronic | ICD-10-CM

## 2024-11-06 RX ORDER — ROSUVASTATIN CALCIUM 5 MG/1
5 TABLET, COATED ORAL NIGHTLY
Qty: 90 TABLET | Refills: 1 | Status: SHIPPED | OUTPATIENT
Start: 2024-11-06

## 2024-11-19 DIAGNOSIS — I10 ESSENTIAL HYPERTENSION: Chronic | ICD-10-CM

## 2024-11-20 ENCOUNTER — OFFICE VISIT (OUTPATIENT)
Dept: INTERNAL MEDICINE | Age: 54
End: 2024-11-20
Payer: COMMERCIAL

## 2024-11-20 VITALS
HEART RATE: 69 BPM | BODY MASS INDEX: 43.25 KG/M2 | OXYGEN SATURATION: 96 % | DIASTOLIC BLOOD PRESSURE: 88 MMHG | TEMPERATURE: 97.1 F | SYSTOLIC BLOOD PRESSURE: 140 MMHG | WEIGHT: 292 LBS | HEIGHT: 69 IN

## 2024-11-20 DIAGNOSIS — E66.01 MORBID OBESITY WITH BMI OF 40.0-44.9, ADULT: Chronic | ICD-10-CM

## 2024-11-20 DIAGNOSIS — I10 ESSENTIAL HYPERTENSION: Primary | Chronic | ICD-10-CM

## 2024-11-20 DIAGNOSIS — R73.09 ELEVATED HEMOGLOBIN A1C: Chronic | ICD-10-CM

## 2024-11-20 DIAGNOSIS — E78.00 PURE HYPERCHOLESTEROLEMIA: Chronic | ICD-10-CM

## 2024-11-20 PROCEDURE — 99214 OFFICE O/P EST MOD 30 MIN: CPT | Performed by: INTERNAL MEDICINE

## 2024-11-20 NOTE — PROGRESS NOTES
J  U  N  O  H    K  I  M ,   M  D                  I  N  T  E  R  N  A  L    M  E  D  I  C  I  N  E         ENCOUNTER DATE:  11/20/2024    Moose Siddiqui / 54 y.o. / male    OFFICE VISIT ENCOUNTER       CHIEF COMPLAINT / REASON FOR OFFICE VISIT     Hypertension and mole (Neck right side)      ASSESSMENT & PLAN     Problem List Items Addressed This Visit          High    Essential hypertension - Primary (Chronic)    Overview     Continue losartan 100 mg, HCTZ 12.5 mg and amlodipine 5 mg in the evening.          Current Assessment & Plan     BP Readings from Last 3 Encounters:   11/20/24 140/88   05/31/24 150/82   01/24/24 146/100      Blood pressure is elevated but better at home (< 130/80).   Continue same medications.            Relevant Medications    losartan (COZAAR) 100 MG tablet    hydroCHLOROthiazide 12.5 MG tablet    amLODIPine (NORVASC) 5 MG tablet       Medium    Morbid obesity with BMI of 40.0-44.9, adult (Chronic)    Current Assessment & Plan     Wt Readings from Last 3 Encounters:   11/20/24 132 kg (292 lb)   05/31/24 134 kg (295 lb)   01/24/24 127 kg (280 lb)     Body mass index is 43.12 kg/m².     Did not start Zepbound due to cost.   Maintain a low sugar/starch/carbohydrate diet.          Hyperlipidemia (Chronic)    Overview     Continue rosuvastatin 5 mg daily         Relevant Medications    rosuvastatin (CRESTOR) 5 MG tablet    Elevated hemoglobin A1c (Chronic)     No orders of the defined types were placed in this encounter.    No orders of the defined types were placed in this encounter.      SUMMARY/DISCUSSION  Enlarging SK on right collar region. Advised to see dermatologist.     TOTAL TIME OF ENCOUNTER:        Next Appointment with me: Visit date not found    Return in about 4 months (around 3/20/2025) for Reassess chronic medical problems.      VITAL SIGNS     Vitals:    11/20/24 1123   BP: 140/88   Pulse: 69   Temp: 97.1 °F (36.2 °C)   SpO2: 96%   Weight:  "132 kg (292 lb)   Height: 175.3 cm (69\")       BP Readings from Last 3 Encounters:   11/20/24 140/88   05/31/24 150/82   01/24/24 146/100     Wt Readings from Last 3 Encounters:   11/20/24 132 kg (292 lb)   05/31/24 134 kg (295 lb)   01/24/24 127 kg (280 lb)     Body mass index is 43.12 kg/m².    Blood pressure readings recorded on patient flowsheet:       No data to display                  MEDICATIONS AT THE TIME OF OFFICE VISIT     Current Outpatient Medications on File Prior to Visit   Medication Sig    amLODIPine (NORVASC) 5 MG tablet Take 1 tablet by mouth every evening.    fexofenadine (ALLEGRA) 180 MG tablet Take 1 tablet by mouth Daily.    hydroCHLOROthiazide 12.5 MG tablet Take 1 tablet by mouth daily.    losartan (COZAAR) 100 MG tablet Take 1 tablet by mouth daily.    rosuvastatin (CRESTOR) 5 MG tablet Take 1 tablet by mouth every night.    traZODone (DESYREL) 50 MG tablet Take 1 tablet by mouth once nightly.    [DISCONTINUED] Tirzepatide-Weight Management (ZEPBOUND) 2.5 MG/0.5ML solution auto-injector Inject 0.5 mL under the skin into the appropriate area as directed 1 (One) Time Per Week. (Patient not taking: Reported on 11/20/2024)     No current facility-administered medications on file prior to visit.          HISTORY OF PRESENT ILLNESS     Blood pressure at home is well-controlled less than 130/80 on losartan hydrochlorothiazide and amlodipine.  He is on rosuvastatin 5 mg for hyperlipidemia.  He did not start Zepbound due to cost of medication.  His weight did go up to 305 pounds but he has been working on it and is currently 292 pounds.    Patient Care Team:  Luis Antonio Berumen MD as PCP - General (Internal Medicine)  Moose Michael MD as Consulting Physician (Urology)    REVIEW OF SYSTEMS     Review of Systems       PHYSICAL EXAMINATION     Physical Exam  Alert with normal thought and judgment.   Morbid obesity       REVIEWED DATA     Labs:     Lab Results   Component Value Date     " "01/16/2024    K 4.1 01/16/2024    CALCIUM 9.5 01/16/2024    AST 22 01/16/2024    ALT 24 01/16/2024    BUN 15 01/16/2024    CREATININE 1.04 01/16/2024    CREATININE 1.00 10/24/2023    CREATININE 0.96 06/23/2023    EGFRRESULT 84.8 01/16/2024     Lab Results   Component Value Date    HGBA1C 5.50 01/16/2024    HGBA1C 5.50 06/23/2023    HGBA1C 5.70 (H) 12/09/2022     Lab Results   Component Value Date     (H) 01/16/2024    LDL 56 06/23/2023    LDL 80 05/27/2022    HDL 75 (H) 01/16/2024    HDL 52 06/23/2023    TRIG 93 01/16/2024    TRIG 114 06/23/2023     Lab Results   Component Value Date    TSH 0.988 01/16/2024    TSH 0.988 01/19/2022    TSH 1.090 02/19/2019    FREET4 1.20 01/16/2024    FREET4 1.34 01/19/2022    FREET4 1.27 02/19/2019     Lab Results   Component Value Date    WBC 5.88 01/16/2024    HGB 16.5 01/16/2024     01/16/2024     No results found for: \"MALBCRERATIO\"        Imaging:               Medical Tests:               Summary of old records / correspondence / consultant report:             Request outside records:       "

## 2024-11-20 NOTE — ASSESSMENT & PLAN NOTE
Wt Readings from Last 3 Encounters:   11/20/24 132 kg (292 lb)   05/31/24 134 kg (295 lb)   01/24/24 127 kg (280 lb)     Body mass index is 43.12 kg/m².     Did not start Zepbound due to cost.   Maintain a low sugar/starch/carbohydrate diet.

## 2024-11-20 NOTE — ASSESSMENT & PLAN NOTE
BP Readings from Last 3 Encounters:   11/20/24 140/88   05/31/24 150/82   01/24/24 146/100      Blood pressure is elevated but better at home (< 130/80).   Continue same medications.

## 2024-11-22 RX ORDER — LOSARTAN POTASSIUM 100 MG/1
100 TABLET ORAL DAILY
Qty: 90 TABLET | Refills: 0 | Status: SHIPPED | OUTPATIENT
Start: 2024-11-22

## 2025-01-20 DIAGNOSIS — I10 ESSENTIAL HYPERTENSION: Chronic | ICD-10-CM

## 2025-01-21 RX ORDER — HYDROCHLOROTHIAZIDE 12.5 MG/1
12.5 TABLET ORAL DAILY
Qty: 90 TABLET | Refills: 1 | Status: SHIPPED | OUTPATIENT
Start: 2025-01-21

## 2025-02-19 DIAGNOSIS — I10 ESSENTIAL HYPERTENSION: Chronic | ICD-10-CM

## 2025-02-26 RX ORDER — LOSARTAN POTASSIUM 100 MG/1
100 TABLET ORAL DAILY
Qty: 90 TABLET | Refills: 1 | Status: SHIPPED | OUTPATIENT
Start: 2025-02-26

## 2025-03-20 DIAGNOSIS — F51.01 PRIMARY INSOMNIA: ICD-10-CM

## 2025-03-21 RX ORDER — TRAZODONE HYDROCHLORIDE 50 MG/1
50 TABLET ORAL NIGHTLY
Qty: 30 TABLET | Refills: 5 | Status: SHIPPED | OUTPATIENT
Start: 2025-03-21

## 2025-04-21 DIAGNOSIS — I10 ESSENTIAL HYPERTENSION: Chronic | ICD-10-CM

## 2025-04-22 RX ORDER — AMLODIPINE BESYLATE 5 MG/1
5 TABLET ORAL EVERY EVENING
Qty: 30 TABLET | Refills: 4 | Status: SHIPPED | OUTPATIENT
Start: 2025-04-22

## 2025-05-02 DIAGNOSIS — E66.01 MORBID OBESITY WITH BMI OF 40.0-44.9, ADULT: Primary | Chronic | ICD-10-CM

## 2025-05-07 ENCOUNTER — PRIOR AUTHORIZATION (OUTPATIENT)
Dept: INTERNAL MEDICINE | Age: 55
End: 2025-05-07
Payer: COMMERCIAL

## 2025-05-07 NOTE — TELEPHONE ENCOUNTER
ZEPBOUND IS NOT COVERED BY INSURANCE PLAN .  This request cannot be processed due to the medication is not covered by the plan.

## 2025-05-21 DIAGNOSIS — I10 ESSENTIAL HYPERTENSION: Chronic | ICD-10-CM

## 2025-05-22 RX ORDER — HYDROCHLOROTHIAZIDE 12.5 MG/1
12.5 TABLET ORAL DAILY
Qty: 90 TABLET | Refills: 1 | Status: SHIPPED | OUTPATIENT
Start: 2025-05-22

## 2025-06-09 DIAGNOSIS — E78.00 PURE HYPERCHOLESTEROLEMIA: Chronic | ICD-10-CM

## 2025-06-09 DIAGNOSIS — I10 ESSENTIAL HYPERTENSION: Primary | Chronic | ICD-10-CM

## 2025-06-09 DIAGNOSIS — R73.09 ELEVATED HEMOGLOBIN A1C: Chronic | ICD-10-CM

## 2025-06-24 DIAGNOSIS — I10 ESSENTIAL HYPERTENSION: Chronic | ICD-10-CM

## 2025-06-25 RX ORDER — LOSARTAN POTASSIUM 100 MG/1
100 TABLET ORAL DAILY
Qty: 30 TABLET | Refills: 0 | Status: SHIPPED | OUTPATIENT
Start: 2025-06-25

## 2025-07-01 ENCOUNTER — OFFICE VISIT (OUTPATIENT)
Dept: INTERNAL MEDICINE | Age: 55
End: 2025-07-01
Payer: COMMERCIAL

## 2025-07-01 VITALS
HEART RATE: 78 BPM | SYSTOLIC BLOOD PRESSURE: 130 MMHG | RESPIRATION RATE: 17 BRPM | WEIGHT: 298 LBS | HEIGHT: 69 IN | BODY MASS INDEX: 44.14 KG/M2 | DIASTOLIC BLOOD PRESSURE: 82 MMHG | OXYGEN SATURATION: 96 % | TEMPERATURE: 96.8 F

## 2025-07-01 DIAGNOSIS — E66.01 MORBID OBESITY WITH BMI OF 40.0-44.9, ADULT: Chronic | ICD-10-CM

## 2025-07-01 DIAGNOSIS — R73.09 ELEVATED HEMOGLOBIN A1C: Chronic | ICD-10-CM

## 2025-07-01 DIAGNOSIS — I10 ESSENTIAL HYPERTENSION: Primary | Chronic | ICD-10-CM

## 2025-07-01 DIAGNOSIS — E78.00 PURE HYPERCHOLESTEROLEMIA: Chronic | ICD-10-CM

## 2025-07-01 PROCEDURE — 99214 OFFICE O/P EST MOD 30 MIN: CPT | Performed by: INTERNAL MEDICINE

## 2025-07-01 NOTE — PROGRESS NOTES
J  U  N  O  H    K  I  M ,   M  D        I  N  T  E  R  N  A  L    M  E  D  I  C  I  N  E         ENCOUNTER DATE:  07/01/2025    Moose Siddiqui / 54 y.o. / male    OFFICE VISIT ENCOUNTER       CHIEF COMPLAINT / REASON FOR OFFICE VISIT     Hypertension, Hyperlipidemia, and Obesity      ASSESSMENT & PLAN     Problem List Items Addressed This Visit          High    Essential hypertension - Primary (Chronic)    Overview   Continue losartan 100 mg, HCTZ 12.5 mg and amlodipine 5 mg in the evening.          Relevant Medications    amLODIPine (NORVASC) 5 MG tablet    hydroCHLOROthiazide 12.5 MG tablet    losartan (COZAAR) 100 MG tablet       Medium    Morbid obesity with BMI of 40.0-44.9, adult (Chronic)    Current Assessment & Plan   Wt Readings from Last 3 Encounters:   07/01/25 135 kg (298 lb)   11/20/24 132 kg (292 lb)   05/31/24 134 kg (295 lb)     Body mass index is 43.99 kg/m².     Did not start Zepbound due to high cost. He will try to improve his lifestyle changes / diet to lose weight. Suggested metformin for prediabetes. He will let me know.   Labs prior to follow-up in 6 months if further weight gain.          Hyperlipidemia (Chronic)    Overview   Continue rosuvastatin 5 mg daily         Current Assessment & Plan      Lab Results   Component Value Date    LDL 61 06/25/2025     (H) 01/16/2024    LDL 56 06/23/2023     Lab Results   Component Value Date    HDL 53 06/25/2025    HDL 75 (H) 01/16/2024    HDL 52 06/23/2023     Lab Results   Component Value Date    TRIG 87 06/25/2025      LDL cholesterol is improved. Continue rosuvastatin 5 mg. Maintain low saturated fat/cholesterol diet.  Weight loss advised.          Relevant Medications    rosuvastatin (CRESTOR) 5 MG tablet    Elevated hemoglobin A1c (Chronic)     No orders of the defined types were placed in this encounter.    No orders of the defined types were placed in this encounter.      SUMMARY/DISCUSSION        TOTAL TIME OF  "ENCOUNTER:        Next Appointment with me: Visit date not found     Return in about 6 months (around 1/1/2026) for Reassess chronic medical problems.      VITAL SIGNS     Vitals:    07/01/25 0754   BP: 130/82   BP Location: Left arm   Patient Position: Sitting   Cuff Size: Adult   Pulse: 78   Resp: 17   Temp: 96.8 °F (36 °C)   TempSrc: Temporal   SpO2: 96%   Weight: 135 kg (298 lb)   Height: 175.3 cm (69.02\")       BP Readings from Last 3 Encounters:   07/01/25 130/82   11/20/24 140/88   05/31/24 150/82     Wt Readings from Last 3 Encounters:   07/01/25 135 kg (298 lb)   11/20/24 132 kg (292 lb)   05/31/24 134 kg (295 lb)     Body mass index is 43.99 kg/m².    Blood pressure readings recorded on patient flowsheet:       No data to display                  MEDICATIONS AT THE TIME OF OFFICE VISIT     Current Outpatient Medications on File Prior to Visit   Medication Sig    amLODIPine (NORVASC) 5 MG tablet Take 1 tablet by mouth every evening.    fexofenadine (ALLEGRA) 180 MG tablet Take 1 tablet by mouth Daily.    hydroCHLOROthiazide 12.5 MG tablet Take 1 tablet by mouth daily.    losartan (COZAAR) 100 MG tablet Take 1 tablet by mouth daily.    rosuvastatin (CRESTOR) 5 MG tablet Take 1 tablet by mouth every night.    traZODone (DESYREL) 50 MG tablet Take 1 tablet by mouth once nightly.    [DISCONTINUED] Tirzepatide-Weight Management (ZEPBOUND) 2.5 MG/0.5ML solution auto-injector Inject 0.5 mL under the skin into the appropriate area as directed 1 (One) Time Per Week. (Patient not taking: Reported on 7/1/2025)     No current facility-administered medications on file prior to visit.          HISTORY OF PRESENT ILLNESS     He did not start Zepbound due to high cost and has gained some additional weight since the last visit in November.  Hypertension remains stable on multidrug regimen without significant side effects.  LDL cholesterol is lower on rosuvastatin 5 mg without significant side effects.  Labs show impaired " "fasting glucose with slight increase in A1c to 5.6 which is about his average.    Patient Care Team:  Luis Antonio Berumen MD as PCP - General (Internal Medicine)  Moose Michael MD as Consulting Physician (Urology)    REVIEW OF SYSTEMS     Review of Systems   Constitutional neg except per HPI   Resp neg  CV neg     PHYSICAL EXAMINATION     Physical Exam  Alert with normal thought and judgment.   Normal affect and mood.  Morbid obesity       REVIEWED DATA     Labs:     Lab Results   Component Value Date     06/25/2025    K 3.9 06/25/2025    CALCIUM 9.5 06/25/2025    AST 18 06/25/2025    ALT 24 06/25/2025    BUN 18.0 06/25/2025    CREATININE 0.88 06/25/2025    CREATININE 1.04 01/16/2024    CREATININE 1.00 10/24/2023    EGFR 100.9 06/25/2025     Lab Results   Component Value Date    HGBA1C 5.60 06/25/2025    HGBA1C 5.50 01/16/2024    HGBA1C 5.50 06/23/2023   No results found for: \"MALBCRERATIO\"  Lab Results   Component Value Date    LDL 61 06/25/2025     (H) 01/16/2024    LDL 56 06/23/2023    HDL 53 06/25/2025    HDL 75 (H) 01/16/2024    TRIG 87 06/25/2025    CHOLHDLRATIO 2.47 06/25/2025     Lab Results   Component Value Date    TSH 0.988 01/16/2024    TSH 0.988 01/19/2022    TSH 1.090 02/19/2019    FREET4 1.20 01/16/2024    FREET4 1.34 01/19/2022    FREET4 1.27 02/19/2019     Lab Results   Component Value Date    WBC 5.88 01/16/2024    HGB 16.5 01/16/2024     01/16/2024           Imaging:               Medical Tests:               Summary of old records / correspondence / consultant report:             Request outside records:       "

## 2025-07-01 NOTE — ASSESSMENT & PLAN NOTE
Lab Results   Component Value Date    LDL 61 06/25/2025     (H) 01/16/2024    LDL 56 06/23/2023     Lab Results   Component Value Date    HDL 53 06/25/2025    HDL 75 (H) 01/16/2024    HDL 52 06/23/2023     Lab Results   Component Value Date    TRIG 87 06/25/2025      LDL cholesterol is improved. Continue rosuvastatin 5 mg. Maintain low saturated fat/cholesterol diet.  Weight loss advised.

## 2025-07-01 NOTE — ASSESSMENT & PLAN NOTE
Wt Readings from Last 3 Encounters:   07/01/25 135 kg (298 lb)   11/20/24 132 kg (292 lb)   05/31/24 134 kg (295 lb)     Body mass index is 43.99 kg/m².     Did not start Zepbound due to high cost. He will try to improve his lifestyle changes / diet to lose weight. Suggested metformin for prediabetes. He will let me know.   Labs prior to follow-up in 6 months if further weight gain.

## 2025-08-01 ENCOUNTER — PATIENT MESSAGE (OUTPATIENT)
Dept: INTERNAL MEDICINE | Age: 55
End: 2025-08-01
Payer: COMMERCIAL

## 2025-08-01 DIAGNOSIS — E66.01 MORBID OBESITY WITH BMI OF 40.0-44.9, ADULT: Primary | ICD-10-CM

## 2025-08-01 RX ORDER — TIRZEPATIDE 2.5 MG/.5ML
2.5 INJECTION, SOLUTION SUBCUTANEOUS WEEKLY
Qty: 2 ML | Refills: 0 | Status: SHIPPED | OUTPATIENT
Start: 2025-08-01

## 2025-08-06 ENCOUNTER — TELEPHONE (OUTPATIENT)
Dept: INTERNAL MEDICINE | Age: 55
End: 2025-08-06
Payer: COMMERCIAL

## 2025-08-27 ENCOUNTER — PATIENT MESSAGE (OUTPATIENT)
Dept: INTERNAL MEDICINE | Age: 55
End: 2025-08-27
Payer: COMMERCIAL

## 2025-08-27 DIAGNOSIS — E66.01 MORBID OBESITY WITH BMI OF 40.0-44.9, ADULT: Primary | Chronic | ICD-10-CM

## 2025-08-27 RX ORDER — TIRZEPATIDE 5 MG/.5ML
5 INJECTION, SOLUTION SUBCUTANEOUS WEEKLY
Qty: 2 ML | Refills: 0 | Status: SHIPPED | OUTPATIENT
Start: 2025-08-27

## (undated) DEVICE — KT ORCA ORCAPOD DISP STRL

## (undated) DEVICE — ADAPT CLN BIOGUARD AIR/H2O DISP

## (undated) DEVICE — SENSR O2 OXIMAX FNGR A/ 18IN NONSTR

## (undated) DEVICE — SINGLE-USE BIOPSY FORCEPS: Brand: RADIAL JAW 4

## (undated) DEVICE — LN SMPL CO2 SHTRM SD STREAM W/M LUER

## (undated) DEVICE — TUBING, SUCTION, 1/4" X 10', STRAIGHT: Brand: MEDLINE

## (undated) DEVICE — CANN O2 ETCO2 FITS ALL CONN CO2 SMPL A/ 7IN DISP LF